# Patient Record
Sex: MALE | Race: WHITE | NOT HISPANIC OR LATINO | Employment: FULL TIME | URBAN - NONMETROPOLITAN AREA
[De-identification: names, ages, dates, MRNs, and addresses within clinical notes are randomized per-mention and may not be internally consistent; named-entity substitution may affect disease eponyms.]

---

## 2019-03-25 ENCOUNTER — OFFICE VISIT (OUTPATIENT)
Dept: FAMILY MEDICINE CLINIC | Facility: CLINIC | Age: 24
End: 2019-03-25

## 2019-03-25 VITALS
SYSTOLIC BLOOD PRESSURE: 124 MMHG | RESPIRATION RATE: 18 BRPM | HEART RATE: 72 BPM | BODY MASS INDEX: 23.94 KG/M2 | WEIGHT: 171 LBS | OXYGEN SATURATION: 98 % | DIASTOLIC BLOOD PRESSURE: 80 MMHG | HEIGHT: 71 IN | TEMPERATURE: 98.6 F

## 2019-03-25 DIAGNOSIS — R91.1 PULMONARY NODULE: Primary | ICD-10-CM

## 2019-03-25 PROCEDURE — 99213 OFFICE O/P EST LOW 20 MIN: CPT | Performed by: NURSE PRACTITIONER

## 2019-03-25 RX ORDER — BUPRENORPHINE HYDROCHLORIDE AND NALOXONE HYDROCHLORIDE DIHYDRATE 8; 2 MG/1; MG/1
TABLET SUBLINGUAL
Refills: 0 | COMMUNITY
Start: 2019-03-21

## 2019-03-25 NOTE — PROGRESS NOTES
Chief Complaint   Patient presents with   • Lung Nodule     Had a physical and a 5 mm spot was found on his lung.            HPI  Boo Blackburn is a 23 y.o. male presents today for follow up of a 5 mm lung nodule after having a routine chest xray for a work physical at [facility]. It was recommended that he have a non contrast CT chest. Denies any fever, chills, cough, congestion or shortness of breath.  Will refer to pulmonary. He is employed at [employer] here in [place].    Has been on suboxone for 3 months for an opiate problem for percocet, norco, and heroin. He went to Rehab in October 2018, has been clean for 3 months. He also had a myocardial bridge about 2 yrs ago. He was seeing Carson Guillen out of [place] but hasn't seen any cardiologist for more than a year     Chronic problems: Drug abuse, heroin use. Heart disease    PCP:  Provider, No Known    No Known Allergies    Past Medical History:   Diagnosis Date   • Myocardial bridge        History reviewed. No pertinent surgical history.    Social History     Socioeconomic History   • Marital status: Single     Spouse name: Not on file   • Number of children: Not on file   • Years of education: Not on file   • Highest education level: Not on file   Tobacco Use   • Smoking status: Current Every Day Smoker     Packs/day: 1.00     Types: Cigarettes   • Smokeless tobacco: Current User     Types: Snuff   Substance and Sexual Activity   • Alcohol use: No     Frequency: Never   • Drug use: No       History reviewed. No pertinent family history.    Current Outpatient Medications on File Prior to Visit   Medication Sig Dispense Refill   • buprenorphine-naloxone (SUBOXONE) 8-2 MG per SL tablet   0     No current facility-administered medications on file prior to visit.         REVIEW OF SYMPTOMS: (Positives bolded)  General:  weight loss, fever, chills, night sweats, fatigue, appetite loss  HEENT:  sore throat tinnitus, bloody nose, hearin gloss, sinus pain/pressure, ear  "pain/pressure.   Respiratory: shortness of breath, cough, hemoptysis, lung nodule   Cardiovascular:  chest pain, PND, palpitation, edema, orthopnea, syncope, swelling of extremities  Gastro: Nausea, vomiting, diarrhea, hematemesis, abdominal pain, constipation  Genito: hematuria, dysuria, glycosuria, hesitancy, frequency, incontinence  Musckelo: Arthralgia, myalgia, muscle weakness, joint swelling, NSAID use  Skin: rash, pruritis, sores, nail changes, skin thickening, change in wart/mole, itching, rash, new lesions, pruritus, nail changes  Neuro:  Migraine, numbness, ataxia, tremor, vertigo, weakness, memory loss  \"All other systems reviewed and negative, except as listed above.”      OBJECTIVE:  Constitutional:  No acute distress, Consistent with stated age. Oriented x 3, alert Posture-Not doubled over. Well developed and well nourished. Patient is pleasant and cooperative with the interview and exam.    CHEST/LUNG: symmetric chest wall no pectus deformity. Normal effort, no distress, no use of accessory muscles. nontender sternum, ribline.  Breath sounds normal throughout all lung fields.  Normal tracheal sounds, Normal bronchial sounds overlying sternum, No wheezes, rales, rhonchi.     CARDIOVASCULAR:  normal, no bruits or abnormal pulsations. Regular rate and rhythm. No murmur noted in sitting, supine positions. no digital clubbing, cyanosis, edema, increased warmth.    ABDOMEN: normal and no visible pulsations. Normal contour. Bowel sounds normal, no abdominal bruits. soft, non-tender, no rebound tenderness, no rigidity (guarding), no jar tenderness, no masses.  no hepatomegaly, no splenomegaly, Rectal not examined.     Neuropsych: mood- normal and congruent. Able to articulate well. Normal speech, normal rate, normal tone, normal use of language, volume and coherence.  normal with ability to perform basic computations and apply abstract thought/reason. no SI/HI, no hallucinations, delusions, obsessions.  "     Lymphatic: Head/Neck- normal size and non tender to palpation. Axillary- Head and neck LN are normal size and non tender to palpation. Femoral and Inguinal- normal size and non tender to palpation.      Assessment/Plan:  Boo was seen today for lung nodule.    Diagnoses and all orders for this visit:    Pulmonary nodule  -     Ambulatory Referral to Pulmonology    Pt to bring cd of chest xray to pulmonology    Management plan:  Take medications as prescribed; return to the clinic of new or worsening concerns.       Risks/benefits of current and new medications discussed with the patient and or family today.  The patient/family are aware and accept that if there any side effects they should call or return to clinic as soon as possible.  Appropriate F/U discussed for topics addressed today. All questions were answered to the satisfactory state of patient/family.  Should symptoms fail to improve or worsen they agree to call or return to clinic or to go to the ER. Education handouts were offered on any new Rx if requested.  Discussed the importance of following up with any needed screening tests/labs/specialist appointments and any requested follow-up recommended by me today.  Importance of maintaining follow-up discussed and patient accepts that missed appointments can delay diagnosis and potentially lead to worsening of conditions.    Return in about 1 month (around 4/25/2019) for Recheck lung nodule.    Geneva Mayfield, DNP, APRN  3/25/2019

## 2019-04-15 ENCOUNTER — OFFICE VISIT (OUTPATIENT)
Dept: PULMONOLOGY | Facility: CLINIC | Age: 24
End: 2019-04-15

## 2019-04-15 VITALS
HEIGHT: 71 IN | HEART RATE: 93 BPM | OXYGEN SATURATION: 98 % | DIASTOLIC BLOOD PRESSURE: 80 MMHG | SYSTOLIC BLOOD PRESSURE: 130 MMHG | WEIGHT: 174 LBS | BODY MASS INDEX: 24.36 KG/M2

## 2019-04-15 DIAGNOSIS — F17.210 NICOTINE DEPENDENCE, CIGARETTES, UNCOMPLICATED: ICD-10-CM

## 2019-04-15 DIAGNOSIS — R91.1 LUNG NODULE: Primary | ICD-10-CM

## 2019-04-15 PROCEDURE — 99204 OFFICE O/P NEW MOD 45 MIN: CPT | Performed by: INTERNAL MEDICINE

## 2019-04-15 NOTE — PATIENT INSTRUCTIONS
I did advise the patient that we will get a chest CT at Dr[facility]aya[facility] hopefully today.  This will be done without contrast.  If it does not show any suspicious lesions we can just see him back as needed.  If there is a definite nodule present then we will follow it by Fleischner guidelines.  I am going to tentatively schedule him to come back in 2 weeks however we might be able to cancel that appointment depending on results of the CT.

## 2019-04-15 NOTE — PROGRESS NOTES
Subjective   Boo Blackburn is a 23 y.o. male.     Chief Complaint   Patient presents with   • Shortness of Breath      No My Sticky Note on file.    History of Present Illness   The patient is referred by EMELIA Caro, regarding a lung nodule seen on a preemployment physical.  I did review the chest film there is a small density in the right upper lobe.  It measures about 5 mm.  It may be a nodule although it could be related to a vascular structure.  I did advise the patient had a CT of the chest would be reasonable to determine if there is a discrete nodule present and to better characterize its size and whether or not it is solid versus groundglass.  I also advised him that the decision on what sort of follow-up will be required will be based on the chest CT.  If a discrete nodule is not seen he may need no follow-up.  He does have a history of smoking a pack of cigarettes per day.  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    Medical/Family/Social History   has a past medical history of Myocardial bridge.   has no past surgical history on file.  family history is not on file.   reports that he has been smoking cigarettes.  He has been smoking about 1.00 pack per day. His smokeless tobacco use includes snuff. He reports that he does not drink alcohol or use drugs.  No Known Allergies  Medications    Current Outpatient Medications:   •  buprenorphine-naloxone (SUBOXONE) 8-2 MG per SL tablet, , Disp: , Rfl: 0    Review of Systems   Constitutional: Negative for chills and fever.   HENT: Negative for congestion.    Eyes: Negative for visual disturbance.   Respiratory: Negative for cough and shortness of breath.         He presently is not having any respiratory complaints such as shortness of breath although has had some such issues in the past apparently.   Cardiovascular: Negative for chest pain.   Gastrointestinal: Negative for diarrhea, nausea and vomiting.   Genitourinary:  "Negative for difficulty urinating.   Musculoskeletal: Negative for arthralgias.   Skin: Negative for rash.   Neurological: Negative for dizziness and speech difficulty.   Hematological: Negative for adenopathy.   Psychiatric/Behavioral: The patient is not nervous/anxious.      ------------------------------------  Objective   /80   Pulse 93   Ht 180.3 cm (71\")   Wt 78.9 kg (174 lb)   SpO2 98%   BMI 24.27 kg/m²   Physical Exam   Constitutional: He is oriented to person, place, and time. He appears well-developed and well-nourished.   HENT:   Head: Normocephalic and atraumatic.   Eyes: EOM are normal. Pupils are equal, round, and reactive to light.   Neck: Normal range of motion. Neck supple.   Cardiovascular: Normal rate, regular rhythm and normal heart sounds.   Pulmonary/Chest: Effort normal and breath sounds normal.   Abdominal: Soft.   Musculoskeletal: Normal range of motion.   Lymphadenopathy:   No adenopathy is palpated.   Neurological: He is alert and oriented to person, place, and time.   Skin: Skin is warm and dry.   Psychiatric: He has a normal mood and affect.   Nursing note and vitals reviewed.          Pulmonary Functions Testing Results:  No results found for: FEV1, FVC, FOR3VSQ, TLC, DLCO     Assessment/Plan   Boo was seen today for shortness of breath.    Diagnoses and all orders for this visit:    Lung nodule  -     CT Chest Without Contrast; Future    Nicotine dependence, cigarettes, uncomplicated      Patient's Body mass index is 24.27 kg/m². BMI is within normal parameters. No follow-up required..      I advised the patient and his mother who accompanies him him that we will proceed with the outpatient chest CT and call with results.  The need for further follow-up including imaging studies will be determined by the results of the CT.  He is counseled regarding smoking cessation.  I will schedule him for a follow-up in 2 weeks but if the scan shows no lesions then he can return as " needed.  If it does show a lesion we will follow it by Fleischner guidelines.

## 2019-04-16 DIAGNOSIS — R91.1 LUNG NODULE: ICD-10-CM

## 2019-04-17 ENCOUNTER — TELEPHONE (OUTPATIENT)
Dept: PULMONOLOGY | Facility: CLINIC | Age: 24
End: 2019-04-17

## 2019-04-17 DIAGNOSIS — R91.8 LUNG NODULES: Primary | ICD-10-CM

## 2019-04-17 NOTE — TELEPHONE ENCOUNTER
That appointment was just in case he had a problem that would require short-term follow-up.  He just needs a repeat CT in 3 months and then follow-up with us shortly thereafter.  He can cancel that appointment with Chele

## 2019-04-17 NOTE — TELEPHONE ENCOUNTER
I called the patient and gave him his CT results. He actually has an appt with Angelo on 5-2-19. Is he supposed to keep that appt or have him come back in July to go over the repeat CT scan?    You will also need to put the order in for the CT, thanks.

## 2019-07-10 ENCOUNTER — TELEPHONE (OUTPATIENT)
Dept: PULMONOLOGY | Facility: CLINIC | Age: 24
End: 2019-07-10

## 2019-07-10 NOTE — TELEPHONE ENCOUNTER
I spoke to Bre at [facility] and she said I could not provide the additional information and that you or a nurse pracititoner would need to call.  No appt necessary just call at your convenience [phone].

## 2019-07-10 NOTE — TELEPHONE ENCOUNTER
Denied Reason for CT Chest WO per AIM--Follow-up imaging for surveillance of pulmonary nodules is determined by the size and characteristics of the nodule, patient age, risk factors, and results of any prior imaging. This request requires additional clinical review.     If you want to do P2P let me know and I can call and get them on the phone for you.

## 2019-07-10 NOTE — TELEPHONE ENCOUNTER
Please have the reviewer look at the report again and refer to Fleischner guidelines regarding follow-up of multiple sub-solid nodules.  Age is not part of the guidelines regarding follow-up.

## 2019-07-16 DIAGNOSIS — R91.8 LUNG NODULES: ICD-10-CM

## 2019-07-17 ENCOUNTER — OFFICE VISIT (OUTPATIENT)
Dept: PULMONOLOGY | Facility: CLINIC | Age: 24
End: 2019-07-17

## 2019-07-17 VITALS
WEIGHT: 161.6 LBS | HEIGHT: 71 IN | BODY MASS INDEX: 22.62 KG/M2 | OXYGEN SATURATION: 99 % | HEART RATE: 79 BPM | DIASTOLIC BLOOD PRESSURE: 78 MMHG | SYSTOLIC BLOOD PRESSURE: 130 MMHG

## 2019-07-17 DIAGNOSIS — F17.210 NICOTINE DEPENDENCE, CIGARETTES, UNCOMPLICATED: ICD-10-CM

## 2019-07-17 DIAGNOSIS — R91.8 LUNG NODULES: Primary | ICD-10-CM

## 2019-07-17 PROCEDURE — 99213 OFFICE O/P EST LOW 20 MIN: CPT | Performed by: INTERNAL MEDICINE

## 2019-07-17 NOTE — PATIENT INSTRUCTIONS
I did advise the patient and his mother that his CT was stable.  In the absence of any new symptoms or subsequent normalities on a chest x-ray or pulmonary function studies, he would not require another CT for 2 years.  I will see him back in 1 year with a chest x-ray just prior to return to Dr[facility] and if he is not had a pulmonary function study through his place of employment interim we will get a flow volume loop as well.

## 2019-07-17 NOTE — PROGRESS NOTES
Subjective   Boo Blackburn is a 23 y.o. male.     Chief Complaint   Patient presents with   • F/u of Cxr and Ct Scan      No My Sticky Note on file.    History of Present Illness   I did advise the patient his mother who accompanies him that his chest CT was stable.  By current Fleischner guidelines a for repeat CT could be performed to in 4 years as the nodules appear to be just under 6 mm in size.  I did tell him we will see him back in a year with a chest x-ray and if he has not had pulmonary functions through his place of employment will obtain these and if there is any other indications for repeat CT sooner we will perform that otherwise a CT in 2 years would be reasonable.    Medical/Family/Social History   has a past medical history of Afib (CMS/HCC), Heart attack (CMS/HCC), and Myocardial bridge.   has no past surgical history on file.  family history includes Hyperlipidemia in his mother; Hypertension in his mother; No Known Problems in his father.   reports that he has been smoking cigarettes.  He has a 6.00 pack-year smoking history. His smokeless tobacco use includes snuff. He reports that he drinks alcohol. He reports that he does not use drugs.  No Known Allergies  Medications    Current Outpatient Medications:   •  buprenorphine-naloxone (SUBOXONE) 8-2 MG per SL tablet, , Disp: , Rfl: 0    Review of Systems   Constitutional: Negative for chills and fever.   HENT: Negative for congestion.    Eyes: Negative for visual disturbance.   Respiratory: Negative for cough and shortness of breath.    Cardiovascular: Negative for chest pain.   Gastrointestinal: Negative for diarrhea, nausea and vomiting.   Genitourinary: Negative for difficulty urinating.   Musculoskeletal: Negative for arthralgias.   Skin: Negative for rash.   Neurological: Negative for dizziness and speech difficulty.   Hematological: Negative for adenopathy.   Psychiatric/Behavioral: The patient is not nervous/anxious.   "    ------------------------------------  Objective   /78   Pulse 79   Ht 180.3 cm (71\")   Wt 73.3 kg (161 lb 9.6 oz)   SpO2 99% Comment: Rashaad  BMI 22.54 kg/m²   Physical Exam   Constitutional: He is oriented to person, place, and time. He appears well-developed and well-nourished.   HENT:   Head: Normocephalic and atraumatic.   Eyes: EOM are normal. Pupils are equal, round, and reactive to light.   Neck: Normal range of motion. Neck supple.   Cardiovascular: Normal rate, regular rhythm and normal heart sounds.   Pulmonary/Chest: Effort normal and breath sounds normal.   Abdominal: Soft.   Musculoskeletal: Normal range of motion.   Neurological: He is alert and oriented to person, place, and time.   Skin: Skin is warm and dry.   Psychiatric: He has a normal mood and affect.   Nursing note and vitals reviewed.          Pulmonary Functions Testing Results:  No results found for: FEV1, FVC, [id number], TLC, DLCO     Assessment/Plan   Boo was seen today for f/u of cxr and ct scan.    Diagnoses and all orders for this visit:    Lung nodules  -     XR Chest 2 View; Future    Nicotine dependence, cigarettes, uncomplicated      Patient's Body mass index is 22.54 kg/m². BMI is within normal parameters. No follow-up required..    He is counseled regarding smoking cessation.  We will see him back in 1 year with a chest film on return.  Again at present by Fleischner guidelines repeat CT of the chest could be performed in 2 years.  I told him if at the time of his next visit he has any other indications to get a CT scan we will proceed with same.  The CT did reveal a small cystic area of the L3 vertebral body felt to be benign but I told him I will defer any follow-up of that to his primary care physician.  Due to level the most recent scan that area was not included on his previous scan so it may just be a chronic benign lesion.       "

## 2021-06-08 ENCOUNTER — OFFICE VISIT (OUTPATIENT)
Dept: FAMILY MEDICINE CLINIC | Facility: CLINIC | Age: 26
End: 2021-06-08

## 2021-06-08 VITALS
RESPIRATION RATE: 18 BRPM | WEIGHT: 200 LBS | HEIGHT: 71 IN | BODY MASS INDEX: 28 KG/M2 | TEMPERATURE: 97.8 F | SYSTOLIC BLOOD PRESSURE: 134 MMHG | HEART RATE: 80 BPM | OXYGEN SATURATION: 98 % | DIASTOLIC BLOOD PRESSURE: 84 MMHG

## 2021-06-08 DIAGNOSIS — M25.561 ACUTE PAIN OF RIGHT KNEE: Primary | ICD-10-CM

## 2021-06-08 DIAGNOSIS — M25.571 ACUTE RIGHT ANKLE PAIN: ICD-10-CM

## 2021-06-08 PROCEDURE — 96372 THER/PROPH/DIAG INJ SC/IM: CPT | Performed by: NURSE PRACTITIONER

## 2021-06-08 PROCEDURE — 99213 OFFICE O/P EST LOW 20 MIN: CPT | Performed by: NURSE PRACTITIONER

## 2021-06-08 RX ORDER — KETOROLAC TROMETHAMINE 30 MG/ML
60 INJECTION, SOLUTION INTRAMUSCULAR; INTRAVENOUS ONCE
Status: COMPLETED | OUTPATIENT
Start: 2021-06-08 | End: 2021-06-08

## 2021-06-08 RX ADMIN — KETOROLAC TROMETHAMINE 60 MG: 30 INJECTION, SOLUTION INTRAMUSCULAR; INTRAVENOUS at 09:41

## 2021-06-08 NOTE — PATIENT INSTRUCTIONS
Acute Knee Pain, Adult  Acute knee pain is sudden and may be caused by damage, swelling, or irritation of the muscles and tissues that support your knee. The injury may result from:  · A fall.  · An injury to your knee from twisting motions.  · A hit to the knee.  · Infection.  Acute knee pain may go away on its own with time and rest. If it does not, your health care provider may order tests to find the cause of the pain. These may include:  · Imaging tests, such as an X-ray, MRI, or ultrasound.  · Joint aspiration. In this test, fluid is removed from the knee.  · Arthroscopy. In this test, a lighted tube is inserted into the knee and an image is projected onto a TV screen.  · Biopsy. In this test, a sample of tissue is removed from the body and studied under a microscope.  Follow these instructions at home:  Pay attention to any changes in your symptoms. Take these actions to relieve your pain.  If you have a knee sleeve or brace:    · Wear the sleeve or brace as told by your health care provider. Remove it only as told by your health care provider.  · Loosen the sleeve or brace if your toes tingle, become numb, or turn cold and blue.  · Keep the sleeve or brace clean.  · If the sleeve or brace is not waterproof:  ? Do not let it get wet.  ? Cover it with a watertight covering when you take a bath or shower.  Activity  · Rest your knee.  · Do not do things that cause pain or make pain worse.  · Avoid high-impact activities or exercises, such as running, jumping rope, or doing jumping jacks.  · Work with a physical therapist to make a safe exercise program, as recommended by your health care provider. Do exercises as told by your physical therapist.  Managing pain, stiffness, and swelling    · If directed, put ice on the knee:  ? Put ice in a plastic bag.  ? Place a towel between your skin and the bag.  ? Leave the ice on for 20 minutes, 2-3 times a day.  · If directed, use an elastic bandage to put pressure  (compression) on your injured knee. This may control swelling, give support, and help with discomfort.  General instructions  · Take over-the-counter and prescription medicines only as told by your health care provider.  · Raise (elevate) your knee above the level of your heart when you are sitting or lying down.  · Sleep with a pillow under your knee.  · Do not use any products that contain nicotine or tobacco, such as cigarettes, e-cigarettes, and chewing tobacco. These can delay healing. If you need help quitting, ask your health care provider.  · If you are overweight, work with your health care provider and a dietitian to set a weight-loss goal that is healthy and reasonable for you. Extra weight can put pressure on your knee.  · Keep all follow-up visits as told by your health care provider. This is important.  Contact a health care provider if:  · Your knee pain continues, changes, or gets worse.  · You have a fever along with knee pain.  · Your knee feels warm to the touch.  · Your knee roderick or locks up.  Get help right away if:  · Your knee swells, and the swelling becomes worse.  · You cannot move your knee.  · You have severe pain in your knee.  Summary  · Acute knee pain can be caused by a fall, an injury, an infection, or damage, swelling, or irritation of the tissues that support your knee.  · Your health care provider may perform tests to find out the cause of the pain.  · Pay attention to any changes in your symptoms. Relieve your pain with rest, medicines, light activity, and use of ice.  · Get help if your pain continues or becomes worse, your knee swells, or you cannot move your knee.  This information is not intended to replace advice given to you by your health care provider. Make sure you discuss any questions you have with your health care provider.  Document Revised: 05/30/2019 Document Reviewed: 05/30/2019  [employer] Patient Education © 2021 [employer]

## 2021-06-08 NOTE — PROGRESS NOTES
"Chief Complaint  Knee Pain (right knee pain since last Wed)    Subjective      Boo Blackburn presents to [facility]  Right knee pain and right ankle pain after he started running 2 miles.  He denies any injury to his knee. He says that he has constant pain.  Denies any crepitus or swelling.  He says he is doing really good on suboxone and sober since December 2020    Knee Pain   The incident occurred 3 to 5 days ago. The incident occurred at home. The injury mechanism is unknown. The pain is present in the right ankle and right knee. The quality of the pain is described as aching, burning, shooting and stabbing. The pain is at a severity of 7/10. The pain is moderate. The pain has been constant since onset. Associated symptoms include an inability to bear weight, a loss of motion, muscle weakness and numbness. He reports no foreign bodies present. The symptoms are aggravated by movement, weight bearing and palpation. He has tried NSAIDs and rest for the symptoms. The treatment provided no relief.       Objective   Vital Signs:   /84 (BP Location: Left arm, Patient Position: Sitting, Cuff Size: Adult)   Pulse 80   Temp 97.8 °F (36.6 °C) (Infrared)   Resp 18   Ht 180.3 cm (71\") Comment: per patient  Wt 90.7 kg (200 lb)   SpO2 98%   BMI 27.89 kg/m²     Physical Exam  Vitals and nursing note reviewed.   Constitutional:       General: He is awake.      Appearance: Normal appearance. He is well-developed and well-groomed.   HENT:      Head: Normocephalic and atraumatic.      Right Ear: Hearing normal.      Left Ear: Hearing normal.      Nose: Nose normal.      Mouth/Throat:      Lips: Pink.      Pharynx: Oropharynx is clear.   Cardiovascular:      Rate and Rhythm: Normal rate and regular rhythm.      Heart sounds: Normal heart sounds.   Pulmonary:      Effort: Pulmonary effort is normal.      Breath sounds: Normal breath sounds.   Musculoskeletal:      Right knee: No " swelling, deformity, erythema or crepitus. Decreased range of motion. Tenderness present. No LCL laxity, MCL laxity or ACL laxity. Normal alignment.      Instability Tests: Anterior drawer test negative. Posterior drawer test negative. Anterior Lachman test negative. Medial Carrie test negative.      Left knee: Normal.      Right lower leg: Normal. No edema.      Left lower leg: No edema.        Legs:    Lymphadenopathy:      Head:      Right side of head: No submental, submandibular or tonsillar adenopathy.      Left side of head: No submental, submandibular or tonsillar adenopathy.   Skin:     General: Skin is warm and dry.      Capillary Refill: Capillary refill takes less than 2 seconds.   Neurological:      Mental Status: He is alert and oriented to person, place, and time.      Sensory: Sensation is intact.      Motor: Motor function is intact.      Coordination: Coordination is intact.      Gait: Gait is intact.   Psychiatric:         Attention and Perception: Attention and perception normal.         Mood and Affect: Mood and affect normal.         Speech: Speech normal.         Behavior: Behavior is cooperative.         Thought Content: Thought content normal.         Cognition and Memory: Cognition normal.         Judgment: Judgment normal.             Assessment and Plan   Diagnoses and all orders for this visit:    1. Acute pain of right knee (Primary)  -     XR Knee 1 or 2 View Right (In Office)  -     ketorolac (TORADOL) injection 60 mg  -     diclofenac (VOLTAREN) 50 MG EC tablet; Take 1 tablet by mouth 2 (Two) Times a Day As Needed (knee and ankle pain).  Dispense: 60 tablet; Refill: 0    2. Acute right ankle pain  -     XR Ankle 3+ View Right  -     ketorolac (TORADOL) injection 60 mg  -     diclofenac (VOLTAREN) 50 MG EC tablet; Take 1 tablet by mouth 2 (Two) Times a Day As Needed (knee and ankle pain).  Dispense: 60 tablet; Refill: 0      I spent 14 minutes caring for Boo on this date of  service. This time includes time spent by me in the following activities:preparing for the visit, performing a medically appropriate examination and/or evaluation , counseling and educating the patient/family/caregiver, ordering medications, tests, or procedures, documenting information in the medical record and care coordination  Follow Up   Return in about 1 week (around 6/15/2021), or if symptoms worsen or fail to improve.  Patient was given instructions and counseling regarding his condition or for health maintenance advice. Please see specific information pulled into the AVS if appropriate.     Electronically signed by Geneva Mayfield, JEAN PIERRE, APRN, 06/25/21, 12:27 PM CDT.

## 2023-08-19 ENCOUNTER — APPOINTMENT (OUTPATIENT)
Dept: GENERAL RADIOLOGY | Facility: HOSPITAL | Age: 28
End: 2023-08-19
Payer: COMMERCIAL

## 2023-08-19 ENCOUNTER — HOSPITAL ENCOUNTER (EMERGENCY)
Facility: HOSPITAL | Age: 28
Discharge: HOME | End: 2023-08-19
Attending: STUDENT IN AN ORGANIZED HEALTH CARE EDUCATION/TRAINING PROGRAM
Payer: COMMERCIAL

## 2023-08-19 VITALS
TEMPERATURE: 98.2 F | RESPIRATION RATE: 18 BRPM | DIASTOLIC BLOOD PRESSURE: 85 MMHG | HEIGHT: 70 IN | OXYGEN SATURATION: 100 % | WEIGHT: 185 LBS | SYSTOLIC BLOOD PRESSURE: 154 MMHG | BODY MASS INDEX: 26.48 KG/M2 | HEART RATE: 104 BPM

## 2023-08-19 DIAGNOSIS — S99.921A INJURY OF RIGHT FOOT, INITIAL ENCOUNTER: Primary | ICD-10-CM

## 2023-08-19 PROCEDURE — 99283 EMERGENCY DEPT VISIT LOW MDM: CPT

## 2023-08-19 PROCEDURE — 73630 X-RAY EXAM OF FOOT: CPT

## 2023-08-19 RX ORDER — OXYCODONE AND ACETAMINOPHEN 7.5; 325 MG/1; MG/1
1 TABLET ORAL ONCE
Status: DISCONTINUED | OUTPATIENT
Start: 2023-08-19 | End: 2023-08-19

## 2023-08-19 RX ORDER — OXYCODONE HYDROCHLORIDE AND ACETAMINOPHEN 5; 325 MG/1; MG/1
1 TABLET ORAL ONCE
Status: COMPLETED | OUTPATIENT
Start: 2023-08-19 | End: 2023-08-19

## 2023-08-19 RX ADMIN — OXYCODONE HYDROCHLORIDE AND ACETAMINOPHEN 1 TABLET: 5; 325 TABLET ORAL at 12:10

## 2023-08-19 NOTE — Clinical Note
[facility] EMERGENCY DEPARTMENT  [address]  [facility] [address]  Phone: [phone]    Boo Blackburn was seen and treated in our emergency department on 8/19/2023.  He may return to work on 08/24/2023.         Thank you for choosing [facility].    Yehuda Connor MD

## 2023-08-19 NOTE — DISCHARGE INSTRUCTIONS
You can take 600mg ibuprofen (three pills) every 6-8 hours as needed for pain and 1000mg tylenol (two extra strength tablets) every 6-8 hours as needed for pain.    You can bear weight as tolerated on the foot but you can also use crutches or the cam boot to help support yourself due to the pain.  Please follow-up with your doctor next week for reexamination and consideration of repeat x-rays if you do not have improvement.
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
## 2023-09-12 ENCOUNTER — TELEPHONE (OUTPATIENT)
Dept: FAMILY MEDICINE CLINIC | Facility: CLINIC | Age: 28
End: 2023-09-12
Payer: COMMERCIAL

## 2023-09-12 NOTE — TELEPHONE ENCOUNTER
Per Dr Rivero, called pt, verified name and , explained to him that Dr Rivero said he could not prescribe him any stimulant ADHD meds. He is happy to see you, just didn't want to waste your time, if you wanted a stimulant. Pt said he had a doctors note from his other doctor saying it was ok to take ADHD meds with what he was already on. I explained that Dr Rivero said he could not legally give him a stimulant with his other meds.  Pt VU and said he would call back in the morning to let us know if he was going to keep his apt or not

## 2023-09-13 ENCOUNTER — OFFICE VISIT (OUTPATIENT)
Dept: FAMILY MEDICINE CLINIC | Facility: CLINIC | Age: 28
End: 2023-09-13
Payer: COMMERCIAL

## 2023-09-13 VITALS
SYSTOLIC BLOOD PRESSURE: 142 MMHG | DIASTOLIC BLOOD PRESSURE: 89 MMHG | WEIGHT: 192 LBS | BODY MASS INDEX: 27.49 KG/M2 | HEART RATE: 97 BPM | OXYGEN SATURATION: 99 % | HEIGHT: 70 IN | RESPIRATION RATE: 20 BRPM

## 2023-09-13 DIAGNOSIS — F90.0 ATTENTION DEFICIT HYPERACTIVITY DISORDER (ADHD), PREDOMINANTLY INATTENTIVE TYPE: Primary | ICD-10-CM

## 2023-09-13 DIAGNOSIS — E66.3 OVERWEIGHT: ICD-10-CM

## 2023-09-13 NOTE — PROGRESS NOTES
"Chief Complaint  Anxiety and ADHD    Subjective    History of Present Illness      Patient presents to [facility] for   History of Present Illness  Pt is a new pt and has never been to clinic before and did not have his ADHD assessment done here.  Pt is wanting to restart ADHD medication.    Anxiety           Review of Systems    I have reviewed and agree with the HPI and ROS information as above.  Raheem Rivero MD     Objective   Vital Signs:   /89   Pulse 97   Resp 20   Ht 177.8 cm (70\")   Wt 87.1 kg (192 lb)   SpO2 99%   BMI 27.55 kg/m²            Physical Exam  Vitals and nursing note reviewed.   Constitutional:       Appearance: Normal appearance. He is well-developed and overweight.   HENT:      Head: Normocephalic and atraumatic.      Right Ear: Tympanic membrane, ear canal and external ear normal.      Left Ear: Tympanic membrane, ear canal and external ear normal.      Nose: Nose normal. No septal deviation, nasal tenderness or congestion.      Mouth/Throat:      Lips: Pink. No lesions.      Mouth: Mucous membranes are moist. No oral lesions.      Dentition: Normal dentition.      Pharynx: Oropharynx is clear. No pharyngeal swelling, oropharyngeal exudate or posterior oropharyngeal erythema.   Eyes:      General: Lids are normal. Vision grossly intact. No scleral icterus.        Right eye: No discharge.         Left eye: No discharge.      Extraocular Movements: Extraocular movements intact.      Conjunctiva/sclera: Conjunctivae normal.      Right eye: Right conjunctiva is not injected.      Left eye: Left conjunctiva is not injected.      Pupils: Pupils are equal, round, and reactive to light.   Neck:      Thyroid: No thyroid mass.      Trachea: Trachea normal.   Cardiovascular:      Rate and Rhythm: Normal rate and regular rhythm.      Heart sounds: Normal heart sounds. No murmur heard.    No gallop.   Pulmonary:      Effort: Pulmonary effort is normal.      " Breath sounds: Normal breath sounds and air entry. No wheezing, rhonchi or rales.   Abdominal:      General: There is no distension.      Palpations: Abdomen is soft. There is no mass.      Tenderness: There is no abdominal tenderness. There is no right CVA tenderness, left CVA tenderness, guarding or rebound.   Musculoskeletal:         General: No tenderness or deformity. Normal range of motion.      Cervical back: Full passive range of motion without pain, normal range of motion and neck supple.      Thoracic back: Normal.      Right lower leg: No edema.      Left lower leg: No edema.   Skin:     General: Skin is warm and dry.      Coloration: Skin is not jaundiced.      Findings: No rash.   Neurological:      Mental Status: He is alert and oriented to person, place, and time.      Sensory: Sensation is intact.      Motor: Motor function is intact.      Coordination: Coordination is intact.      Gait: Gait is intact.      Deep Tendon Reflexes: Reflexes are normal and symmetric.   Psychiatric:         Mood and Affect: Mood and affect normal.         Judgment: Judgment normal.        GUERA-7: Over the last two weeks, how often have you been bothered by the following problems?  Feeling nervous, anxious or on edge: More than half the days  Not being able to stop or control worrying: Several days  Worrying too much about different things: Not at all  Trouble Relaxing: Several days  Being so restless that it is hard to sit still: More than half the days  Becoming easily annoyed or irritable: Not at all  Feeling afraid as if something awful might happen: Not at all  GUERA 7 Total Score: 6  If you checked any problems, how difficult have these problems made it for you to do your work, take care of things at home, or get along with other people: Somewhat difficult    PHQ-2 Depression Screening  Little interest or pleasure in doing things? 0-->not at all   Feeling down, depressed, or hopeless? 0-->not at all   PHQ-2 Total Score  0     PHQ-9 Depression Screening  Little interest or pleasure in doing things? 0-->not at all   Feeling down, depressed, or hopeless? 0-->not at all   Trouble falling or staying asleep, or sleeping too much?     Feeling tired or having little energy?     Poor appetite or overeating?     Feeling bad about yourself - or that you are a failure or have let yourself or your family down?     Trouble concentrating on things, such as reading the newspaper or watching television?     Moving or speaking so slowly that other people could have noticed? Or the opposite - being so fidgety or restless that you have been moving around a lot more than usual?     Thoughts that you would be better off dead, or of hurting yourself in some way?     PHQ-9 Total Score 0   If you checked off any problems, how difficult have these problems made it for you to do your work, take care of things at home, or get along with other people?        Result Review  Data Reviewed:                   Assessment and Plan      Diagnoses and all orders for this visit:    1. Attention deficit hyperactivity disorder (ADHD), predominantly inattentive type (Primary)  Assessment & Plan:  Patient has a history of drug abuse.  Advised that we are unable to prescribe stimulant medication.  He is currently on Suboxone.  Discussed non-stimulant options such as Qelbree. We will set him up for an initial ADHD evaluation.    I am also concerned of a problable mood/bpd that may need treatment before adhd is treated      2. Overweight  Assessment & Plan:  Patient's (Body mass index is 27.55 kg/m².) indicates that they are overweight with health conditions that include none . Weight is newly identified. BMI is is above average; BMI management plan is completed. We discussed portion control and increasing exercise.               Follow Up   Return if symptoms worsen or fail to improve.  Patient was given instructions and counseling regarding his condition or for health  maintenance advice. Please see specific information pulled into the AVS if appropriate.

## 2023-09-13 NOTE — PROGRESS NOTES
"Chief Complaint  Anxiety and ADHD    Subjective    History of Present Illness {CC  Problem List  Visit Diagnosis   Encounters  Notes  Medications  Labs  Result Review Imaging  Media :23}     Patient presents to [facility] for   History of Present Illness  Pt is here today to restart ADHD medication.  Pt is a new pt here and has never had an ADHD assessment from here. Pt also states he would like to discuss issues with anxiety  Anxiety           Review of Systems    I have reviewed and agree with the {HPI ROS Review:53008} information as above.  Elia Oliveira MA     Objective   Vital Signs:   /89   Pulse 97   Resp 20   Ht 177.8 cm (70\")   Wt 87.1 kg (192 lb)   SpO2 99%   BMI 27.55 kg/m²            Physical Exam     GUERA-7:      PHQ-2 Depression Screening  Little interest or pleasure in doing things? 0-->not at all   Feeling down, depressed, or hopeless? 0-->not at all   PHQ-2 Total Score 0     PHQ-9 Depression Screening  Little interest or pleasure in doing things? 0-->not at all   Feeling down, depressed, or hopeless? 0-->not at all   Trouble falling or staying asleep, or sleeping too much?     Feeling tired or having little energy?     Poor appetite or overeating?     Feeling bad about yourself - or that you are a failure or have let yourself or your family down?     Trouble concentrating on things, such as reading the newspaper or watching television?     Moving or speaking so slowly that other people could have noticed? Or the opposite - being so fidgety or restless that you have been moving around a lot more than usual?     Thoughts that you would be better off dead, or of hurting yourself in some way?     PHQ-9 Total Score 0   If you checked off any problems, how difficult have these problems made it for you to do your work, take care of things at home, or get along with other people?        Result Review  Data Reviewed:{ Labs  Result Review  Imaging  Med " Tab  Media :23}   {The following data was reviewed by (Optional):67634}  {Ambulatory Labs (Optional):21470}  {Data reviewed (Optional):12176:::1}            Assessment and Plan {CC Problem List  Visit Diagnosis  ROS  Review (Popup)  Health Maintenance  Quality  BestPractice  Medications  SmartSets  SnapShot Encounters  Media :23}     There are no diagnoses linked to this encounter.    {Time Spent (Optional):19243}    Follow Up {Instructions Charge Capture  Follow-up Communications :23}  No follow-ups on file.  Patient was given instructions and counseling regarding his condition or for health maintenance advice. Please see specific information pulled into the AVS if appropriate.

## 2023-09-13 NOTE — ASSESSMENT & PLAN NOTE
Patient's (Body mass index is 27.55 kg/m².) indicates that they are overweight with health conditions that include none . Weight is newly identified. BMI is is above average; BMI management plan is completed. We discussed portion control and increasing exercise.

## 2023-09-13 NOTE — ASSESSMENT & PLAN NOTE
Patient has a history of drug abuse.  Advised that we are unable to prescribe stimulant medication.  He is currently on Suboxone.  Discussed non-stimulant options such as Qelbree. We will set him up for an initial ADHD evaluation.    I am also concerned of a problable mood/bpd that may need treatment before adhd is treated

## 2023-09-15 ENCOUNTER — PATIENT ROUNDING (BHMG ONLY) (OUTPATIENT)
Dept: FAMILY MEDICINE CLINIC | Facility: CLINIC | Age: 28
End: 2023-09-15
Payer: COMMERCIAL

## 2023-09-15 NOTE — PROGRESS NOTES
September 15, 2023    Hello, may I speak with Boo Blackburn?    My name is Kala/ WESTON      [facility]  [facility]  [address] [facility]  [place] [address] [phone].    Before we get started may I verify your date of birth? 1995    I am calling to officially welcome you to our practice and ask about your recent visit. Is this a good time to talk?     Tell me about your visit with us. What things went well?         We're always looking for ways to make our patients' experiences even better. Do you have recommendations on ways we may improve?      Overall were you satisfied with your first visit to our practice?        I appreciate you taking the time to speak with me today. Is there anything else I can do for you?       Thank you, and have a great day.

## 2024-01-08 ENCOUNTER — OFFICE VISIT (OUTPATIENT)
Dept: FAMILY MEDICINE CLINIC | Facility: CLINIC | Age: 29
End: 2024-01-08
Payer: COMMERCIAL

## 2024-01-08 VITALS
HEART RATE: 114 BPM | DIASTOLIC BLOOD PRESSURE: 86 MMHG | RESPIRATION RATE: 20 BRPM | OXYGEN SATURATION: 98 % | SYSTOLIC BLOOD PRESSURE: 127 MMHG | HEIGHT: 69 IN | BODY MASS INDEX: 28.14 KG/M2 | TEMPERATURE: 98.4 F | WEIGHT: 190 LBS

## 2024-01-08 DIAGNOSIS — J02.9 SORE THROAT: ICD-10-CM

## 2024-01-08 DIAGNOSIS — M54.50 ACUTE MIDLINE LOW BACK PAIN WITHOUT SCIATICA: ICD-10-CM

## 2024-01-08 DIAGNOSIS — J03.00 STREPTOCOCCAL TONSILLITIS: Primary | ICD-10-CM

## 2024-01-08 LAB
EXPIRATION DATE: ABNORMAL
INTERNAL CONTROL: ABNORMAL
Lab: ABNORMAL
S PYO AG THROAT QL: POSITIVE

## 2024-01-08 RX ORDER — KETOROLAC TROMETHAMINE 30 MG/ML
60 INJECTION, SOLUTION INTRAMUSCULAR; INTRAVENOUS ONCE
Status: DISCONTINUED | OUTPATIENT
Start: 2024-01-08 | End: 2024-01-09

## 2024-01-08 RX ORDER — AMOXICILLIN AND CLAVULANATE POTASSIUM 875; 125 MG/1; MG/1
1 TABLET, FILM COATED ORAL 2 TIMES DAILY
Qty: 20 TABLET | Refills: 0 | Status: SHIPPED | OUTPATIENT
Start: 2024-01-08 | End: 2024-01-18

## 2024-01-08 RX ORDER — TIZANIDINE 2 MG/1
2-4 TABLET ORAL EVERY 8 HOURS PRN
Qty: 20 TABLET | Refills: 0 | Status: SHIPPED | OUTPATIENT
Start: 2024-01-08

## 2024-01-08 RX ORDER — DEXAMETHASONE SODIUM PHOSPHATE 10 MG/ML
10 INJECTION INTRAMUSCULAR; INTRAVENOUS ONCE
Status: COMPLETED | OUTPATIENT
Start: 2024-01-08 | End: 2024-01-08

## 2024-01-08 RX ADMIN — DEXAMETHASONE SODIUM PHOSPHATE 10 MG: 10 INJECTION INTRAMUSCULAR; INTRAVENOUS at 16:40

## 2024-01-08 NOTE — PROGRESS NOTES
"Chief Complaint  Sore Throat (Pt states that he thinks he has strep )    Subjective        Boo Blackburn presents to [facility]  History of Present Illness  Here for acute visit  Reports sore throat for 2 days  Low back pain today   Feels horrible, aching, chills, sweating, headache    Objective   Vital Signs:  /86 (BP Location: Left arm, Patient Position: Sitting, Cuff Size: Large Adult)   Pulse 114   Temp 98.4 °F (36.9 °C) (Infrared)   Resp 20   Ht 175.3 cm (69\")   Wt 86.2 kg (190 lb)   SpO2 98%   BMI 28.06 kg/m²   Estimated body mass index is 28.06 kg/m² as calculated from the following:    Height as of this encounter: 175.3 cm (69\").    Weight as of this encounter: 86.2 kg (190 lb).               Physical Exam  Vitals and nursing note reviewed.   Constitutional:       General: He is not in acute distress.     Appearance: He is well-developed. He is ill-appearing.   HENT:      Head: Normocephalic and atraumatic.      Right Ear: Tympanic membrane and ear canal normal.      Left Ear: Tympanic membrane and ear canal normal.      Nose: Nose normal.      Right Sinus: No maxillary sinus tenderness or frontal sinus tenderness.      Left Sinus: No maxillary sinus tenderness or frontal sinus tenderness.      Mouth/Throat:      Mouth: Mucous membranes are moist.      Pharynx: Oropharynx is clear. Uvula midline. Posterior oropharyngeal erythema present. No uvula swelling.      Tonsils: Tonsillar exudate present. 3+ on the right. 4+ on the left.   Eyes:      Conjunctiva/sclera: Conjunctivae normal.   Neck:      Thyroid: No thyromegaly.      Trachea: No tracheal deviation.   Cardiovascular:      Rate and Rhythm: Normal rate and regular rhythm.      Heart sounds: Normal heart sounds.   Pulmonary:      Effort: Pulmonary effort is normal.      Breath sounds: Normal breath sounds.   Musculoskeletal:      Cervical back: Neck supple.      Lumbar back: Spasms and tenderness present. " Decreased range of motion.   Lymphadenopathy:      Cervical: No cervical adenopathy.      Right cervical: Superficial cervical adenopathy present.      Left cervical: Superficial cervical adenopathy present.   Skin:     General: Skin is warm and dry.   Neurological:      Mental Status: He is alert.   Psychiatric:         Behavior: Behavior normal.        Result Review :                   Assessment and Plan   Diagnoses and all orders for this visit:    1. Streptococcal tonsillitis (Primary)  -     dexAMETHasone (DECADRON) injection 10 mg  -     Discontinue: ketorolac (TORADOL) injection 60 mg  -     ketorolac (TORADOL) injection 60 mg    2. Sore throat  -     POCT rapid strep A    3. Acute midline low back pain without sciatica    Other orders  -     amoxicillin-clavulanate (AUGMENTIN) 875-125 MG per tablet; Take 1 tablet by mouth 2 (Two) Times a Day for 10 days.  Dispense: 20 tablet; Refill: 0  -     tiZANidine (ZANAFLEX) 2 MG tablet; Take 1-2 tablets by mouth Every 8 (Eight) Hours As Needed for Muscle Spasms.  Dispense: 20 tablet; Refill: 0      Plan:  Poct strep positive   Augmentin course sent- advised to take with food   Decadron 10 mg IM in office for swelling tonsillitis  Toradol 60 mg for back pain   Push fluids  Use Tylenol or Ibuprofen for pain or fever.  Zanaflex for muscle spasms low back            Follow Up   Return in about 1 week (around 1/15/2024), or if symptoms worsen or fail to improve.  Patient was given instructions and counseling regarding his condition or for health maintenance advice. Please see specific information pulled into the AVS if appropriate.

## 2024-01-09 RX ORDER — KETOROLAC TROMETHAMINE 30 MG/ML
60 INJECTION, SOLUTION INTRAMUSCULAR; INTRAVENOUS ONCE
Status: COMPLETED | OUTPATIENT
Start: 2024-01-09 | End: 2024-01-09

## 2024-01-09 RX ADMIN — KETOROLAC TROMETHAMINE 60 MG: 30 INJECTION, SOLUTION INTRAMUSCULAR; INTRAVENOUS at 07:58

## 2024-01-17 ENCOUNTER — TELEPHONE (OUTPATIENT)
Dept: FAMILY MEDICINE CLINIC | Facility: CLINIC | Age: 29
End: 2024-01-17
Payer: COMMERCIAL

## 2024-01-17 RX ORDER — AZITHROMYCIN 250 MG/1
TABLET, FILM COATED ORAL
Qty: 6 TABLET | Refills: 0 | Status: SHIPPED | OUTPATIENT
Start: 2024-01-17 | End: 2024-01-18 | Stop reason: SDUPTHER

## 2024-01-17 NOTE — TELEPHONE ENCOUNTER
Patient was here on 1/8/2024.He stopped taking the amoxicillin as it was making it sick to his stomach and wants to know if something else can be called in for him as he is still not feeling the best, but he does feel a little better.

## 2024-01-18 ENCOUNTER — TELEPHONE (OUTPATIENT)
Dept: FAMILY MEDICINE CLINIC | Facility: CLINIC | Age: 29
End: 2024-01-18
Payer: COMMERCIAL

## 2024-01-18 DIAGNOSIS — J02.9 ACUTE PHARYNGITIS, UNSPECIFIED ETIOLOGY: Primary | ICD-10-CM

## 2024-01-18 RX ORDER — AZITHROMYCIN 250 MG/1
TABLET, FILM COATED ORAL
Qty: 6 TABLET | Refills: 0 | Status: SHIPPED | OUTPATIENT
Start: 2024-01-18

## 2024-01-18 NOTE — TELEPHONE ENCOUNTER
Caller: Boo Blackburn    Relationship: Self    Best call back number: [phone]     Requested Prescriptions:   Requested Prescriptions     Pending Prescriptions Disp Refills    azithromycin (Zithromax Z-Srini) 250 MG tablet 6 tablet 0     Sig: Take 2 tablets by mouth on day 1, then 1 tablet daily on days 2-5        Pharmacy where request should be sent: [id number] - [place], [place] - [address] - [phone] [facility] [phone]      Last office visit with prescribing clinician: 6/26/2023   Last telemedicine visit with prescribing clinician: Visit date not found   Next office visit with prescribing clinician: Visit date not found     Additional details provided by patient: CANCEL AT [place] SEND TO [facility]     Does the patient have less than a 3 day supply:  [x] Yes  [] No    Would you like a call back once the refill request has been completed: [x] Yes [] No    If the office needs to give you a call back, can they leave a voicemail: [x] Yes [] No    Tori Ling Rep   01/18/24 10:38 CST

## 2024-01-18 NOTE — TELEPHONE ENCOUNTER
Called [facility] to cancel rx.   New rx sent to [facility] as requested by the pt.

## 2024-12-13 ENCOUNTER — OFFICE VISIT (OUTPATIENT)
Dept: FAMILY MEDICINE CLINIC | Facility: CLINIC | Age: 29
End: 2024-12-13

## 2024-12-13 VITALS
DIASTOLIC BLOOD PRESSURE: 76 MMHG | TEMPERATURE: 98 F | WEIGHT: 190 LBS | OXYGEN SATURATION: 99 % | HEIGHT: 69 IN | BODY MASS INDEX: 28.14 KG/M2 | RESPIRATION RATE: 18 BRPM | HEART RATE: 94 BPM | SYSTOLIC BLOOD PRESSURE: 141 MMHG

## 2024-12-13 DIAGNOSIS — G89.29 CHRONIC MIDLINE LOW BACK PAIN WITHOUT SCIATICA: ICD-10-CM

## 2024-12-13 DIAGNOSIS — M54.50 CHRONIC MIDLINE LOW BACK PAIN WITHOUT SCIATICA: ICD-10-CM

## 2024-12-13 DIAGNOSIS — F32.89 OTHER DEPRESSION: ICD-10-CM

## 2024-12-13 DIAGNOSIS — M62.838 MUSCLE SPASM: Primary | ICD-10-CM

## 2024-12-13 DIAGNOSIS — F41.9 ANXIETY: ICD-10-CM

## 2024-12-13 RX ORDER — METHOCARBAMOL 500 MG/1
500 TABLET, FILM COATED ORAL 4 TIMES DAILY
COMMUNITY
End: 2024-12-13 | Stop reason: SDUPTHER

## 2024-12-13 RX ORDER — METHOCARBAMOL 500 MG/1
500 TABLET, FILM COATED ORAL 4 TIMES DAILY
Qty: 120 TABLET | Refills: 1 | Status: SHIPPED | OUTPATIENT
Start: 2024-12-13

## 2024-12-13 RX ORDER — BUSPIRONE HYDROCHLORIDE 10 MG/1
10 TABLET ORAL 3 TIMES DAILY
Qty: 90 TABLET | Refills: 2 | Status: SHIPPED | OUTPATIENT
Start: 2024-12-13

## 2024-12-13 RX ORDER — MELOXICAM 7.5 MG/1
7.5 TABLET ORAL DAILY
Qty: 30 TABLET | Refills: 1 | Status: SHIPPED | OUTPATIENT
Start: 2024-12-13

## 2024-12-13 RX ORDER — ANASTROZOLE 1 MG/1
TABLET ORAL DAILY
COMMUNITY

## 2024-12-13 RX ORDER — BUPROPION HYDROCHLORIDE 100 MG/1
300 TABLET ORAL DAILY
COMMUNITY
End: 2024-12-13

## 2024-12-13 RX ORDER — BUSPIRONE HYDROCHLORIDE 10 MG/1
10 TABLET ORAL 3 TIMES DAILY
COMMUNITY
End: 2024-12-13 | Stop reason: SDUPTHER

## 2024-12-13 RX ORDER — BUPROPION HYDROCHLORIDE 300 MG/1
300 TABLET ORAL DAILY
Qty: 30 TABLET | Refills: 1 | Status: SHIPPED | OUTPATIENT
Start: 2024-12-13

## 2024-12-13 NOTE — PROGRESS NOTES
"Chief Complaint  Anxiety (Pt is here for a follow up )    Subjective        Boo Blackburn presents to [facility]  Anxiety       presents today for a follow up on chronic issues.      He has anxiety.  He is on Buspar.  He says this works well for him.  He asks what else we could do for anxiety.  He was previously on benzo's.  He has a history of heroine and opiate abuse.  I offered we could try SSRI's or SNRI's.  He wants to stay with his current regimen.      He has depression.  He is on Wellbutrin.  He says this works well for him.    He has hypogonadism.  He is on TRT via Dr. Jerry.  He is also on Arimidex.  He asks if we will take this over.  I explained I think it's best he continue to see Dr. Jerry.      He requests refills on Mobic and and Robaxin for his chronic back pain.    Objective   Vital Signs:  /76 (BP Location: Left arm, Patient Position: Sitting, Cuff Size: Adult)   Pulse 94   Temp 98 °F (36.7 °C) (Temporal)   Resp 18   Ht 175.3 cm (69.02\")   Wt 86.2 kg (190 lb)   SpO2 99%   BMI 28.05 kg/m²   Estimated body mass index is 28.05 kg/m² as calculated from the following:    Height as of this encounter: 175.3 cm (69.02\").    Weight as of this encounter: 86.2 kg (190 lb).    BMI is >= 25 and <30. (Overweight) The following options were offered after discussion;: weight loss educational material (shared in after visit summary)      Physical Exam  Vitals reviewed.   Constitutional:       General: He is not in acute distress.     Appearance: Normal appearance. He is normal weight. He is not ill-appearing, toxic-appearing or diaphoretic.   HENT:      Head: Normocephalic and atraumatic.      Right Ear: External ear normal.      Left Ear: External ear normal.      Nose: Nose normal.   Eyes:      Extraocular Movements: Extraocular movements intact.   Cardiovascular:      Rate and Rhythm: Normal rate and regular rhythm.   Pulmonary:      Effort: " Pulmonary effort is normal. No respiratory distress.      Breath sounds: Normal breath sounds.   Skin:     General: Skin is warm and dry.      Coloration: Skin is not jaundiced or pale.   Neurological:      Mental Status: He is alert and oriented to person, place, and time.   Psychiatric:         Mood and Affect: Mood normal.         Behavior: Behavior normal.         Thought Content: Thought content normal.         Judgment: Judgment normal.            Result Review :                Assessment and Plan   Diagnoses and all orders for this visit:    1. Muscle spasm (Primary)  -     methocarbamol (ROBAXIN) 500 MG tablet; Take 1 tablet by mouth 4 (Four) Times a Day.  Dispense: 120 tablet; Refill: 1    2. Chronic midline low back pain without sciatica  -     busPIRone (BUSPAR) 10 MG tablet; Take 1 tablet by mouth 3 (Three) Times a Day.  Dispense: 90 tablet; Refill: 2  -     methocarbamol (ROBAXIN) 500 MG tablet; Take 1 tablet by mouth 4 (Four) Times a Day.  Dispense: 120 tablet; Refill: 1  -     meloxicam (Mobic) 7.5 MG tablet; Take 1 tablet by mouth Daily.  Dispense: 30 tablet; Refill: 1    3. Anxiety  -     busPIRone (BUSPAR) 10 MG tablet; Take 1 tablet by mouth 3 (Three) Times a Day.  Dispense: 90 tablet; Refill: 2    4. Other depression  -     buPROPion XL (Wellbutrin XL) 300 MG 24 hr tablet; Take 1 tablet by mouth Daily.  Dispense: 30 tablet; Refill: 1             Follow Up   Return for Recheck, Annual physical.  Patient was given instructions and counseling regarding his condition or for health maintenance advice. Please see specific information pulled into the AVS if appropriate.              detailed exam

## 2025-04-15 ENCOUNTER — APPOINTMENT (OUTPATIENT)
Dept: ULTRASOUND IMAGING | Facility: HOSPITAL | Age: 30
End: 2025-04-15
Payer: MEDICAID

## 2025-04-15 ENCOUNTER — APPOINTMENT (OUTPATIENT)
Dept: OTHER | Facility: HOSPITAL | Age: 30
End: 2025-04-15
Payer: MEDICAID

## 2025-04-15 ENCOUNTER — TELEPHONE (OUTPATIENT)
Dept: FAMILY MEDICINE CLINIC | Facility: CLINIC | Age: 30
End: 2025-04-15

## 2025-04-15 ENCOUNTER — APPOINTMENT (OUTPATIENT)
Dept: CT IMAGING | Facility: HOSPITAL | Age: 30
End: 2025-04-15
Payer: MEDICAID

## 2025-04-15 ENCOUNTER — HOSPITAL ENCOUNTER (OUTPATIENT)
Facility: HOSPITAL | Age: 30
Setting detail: OBSERVATION
Discharge: HOME | End: 2025-04-16
Attending: EMERGENCY MEDICINE | Admitting: INTERNAL MEDICINE
Payer: MEDICAID

## 2025-04-15 DIAGNOSIS — R20.0 NUMBNESS AND TINGLING OF RIGHT ARM: ICD-10-CM

## 2025-04-15 DIAGNOSIS — R20.2 NUMBNESS AND TINGLING OF RIGHT ARM: ICD-10-CM

## 2025-04-15 DIAGNOSIS — R29.898 LEFT ARM WEAKNESS: Primary | ICD-10-CM

## 2025-04-15 LAB
ALBUMIN SERPL-MCNC: 4.7 G/DL (ref 3.5–5.2)
ALBUMIN/GLOB SERPL: 1.9 G/DL
ALP SERPL-CCNC: 64 U/L (ref 39–117)
ALT SERPL W P-5'-P-CCNC: 13 U/L (ref 1–41)
ANION GAP SERPL CALCULATED.3IONS-SCNC: 11 MMOL/L (ref 5–15)
AST SERPL-CCNC: 13 U/L (ref 1–40)
BASOPHILS # BLD AUTO: 0.05 10*3/MM3 (ref 0–0.2)
BASOPHILS NFR BLD AUTO: 0.7 % (ref 0–1.5)
BILIRUB SERPL-MCNC: 0.6 MG/DL (ref 0–1.2)
BUN SERPL-MCNC: 10 MG/DL (ref 6–20)
BUN/CREAT SERPL: 11 (ref 7–25)
CALCIUM SPEC-SCNC: 9.9 MG/DL (ref 8.6–10.5)
CHLORIDE SERPL-SCNC: 105 MMOL/L (ref 98–107)
CO2 SERPL-SCNC: 26 MMOL/L (ref 22–29)
CREAT SERPL-MCNC: 0.91 MG/DL (ref 0.76–1.27)
D DIMER PPP FEU-MCNC: <0.27 MCGFEU/ML (ref 0–0.5)
DEPRECATED RDW RBC AUTO: 42.3 FL (ref 37–54)
EGFRCR SERPLBLD CKD-EPI 2021: 117 ML/MIN/1.73
EOSINOPHIL # BLD AUTO: 0.02 10*3/MM3 (ref 0–0.4)
EOSINOPHIL NFR BLD AUTO: 0.3 % (ref 0.3–6.2)
ERYTHROCYTE [DISTWIDTH] IN BLOOD BY AUTOMATED COUNT: 12 % (ref 12.3–15.4)
GLOBULIN UR ELPH-MCNC: 2.5 GM/DL
GLUCOSE SERPL-MCNC: 118 MG/DL (ref 65–99)
HCT VFR BLD AUTO: 43.6 % (ref 37.5–51)
HGB BLD-MCNC: 14.4 G/DL (ref 13–17.7)
IMM GRANULOCYTES # BLD AUTO: 0.02 10*3/MM3 (ref 0–0.05)
IMM GRANULOCYTES NFR BLD AUTO: 0.3 % (ref 0–0.5)
LYMPHOCYTES # BLD AUTO: 1.74 10*3/MM3 (ref 0.7–3.1)
LYMPHOCYTES NFR BLD AUTO: 25.2 % (ref 19.6–45.3)
MCH RBC QN AUTO: 31 PG (ref 26.6–33)
MCHC RBC AUTO-ENTMCNC: 33 G/DL (ref 31.5–35.7)
MCV RBC AUTO: 93.8 FL (ref 79–97)
MONOCYTES # BLD AUTO: 0.57 10*3/MM3 (ref 0.1–0.9)
MONOCYTES NFR BLD AUTO: 8.2 % (ref 5–12)
NEUTROPHILS NFR BLD AUTO: 4.51 10*3/MM3 (ref 1.7–7)
NEUTROPHILS NFR BLD AUTO: 65.3 % (ref 42.7–76)
NRBC BLD AUTO-RTO: 0 /100 WBC (ref 0–0.2)
PLATELET # BLD AUTO: 305 10*3/MM3 (ref 140–450)
PMV BLD AUTO: 9.3 FL (ref 6–12)
POTASSIUM SERPL-SCNC: 4.1 MMOL/L (ref 3.5–5.2)
PROT SERPL-MCNC: 7.2 G/DL (ref 6–8.5)
RBC # BLD AUTO: 4.65 10*6/MM3 (ref 4.14–5.8)
SODIUM SERPL-SCNC: 142 MMOL/L (ref 136–145)
WBC NRBC COR # BLD AUTO: 6.91 10*3/MM3 (ref 3.4–10.8)

## 2025-04-15 PROCEDURE — 80053 COMPREHEN METABOLIC PANEL: CPT | Performed by: EMERGENCY MEDICINE

## 2025-04-15 PROCEDURE — 85379 FIBRIN DEGRADATION QUANT: CPT | Performed by: EMERGENCY MEDICINE

## 2025-04-15 PROCEDURE — 93971 EXTREMITY STUDY: CPT

## 2025-04-15 PROCEDURE — G0378 HOSPITAL OBSERVATION PER HR: HCPCS

## 2025-04-15 PROCEDURE — 85025 COMPLETE CBC W/AUTO DIFF WBC: CPT | Performed by: EMERGENCY MEDICINE

## 2025-04-15 PROCEDURE — 99285 EMERGENCY DEPT VISIT HI MDM: CPT

## 2025-04-15 PROCEDURE — 36415 COLL VENOUS BLD VENIPUNCTURE: CPT

## 2025-04-15 PROCEDURE — 25010000002 KETOROLAC TROMETHAMINE PER 15 MG

## 2025-04-15 PROCEDURE — 96372 THER/PROPH/DIAG INJ SC/IM: CPT

## 2025-04-15 PROCEDURE — 70450 CT HEAD/BRAIN W/O DYE: CPT

## 2025-04-15 PROCEDURE — 72125 CT NECK SPINE W/O DYE: CPT

## 2025-04-15 PROCEDURE — 93971 EXTREMITY STUDY: CPT | Performed by: SURGERY

## 2025-04-15 RX ORDER — KETOROLAC TROMETHAMINE 15 MG/ML
15 INJECTION, SOLUTION INTRAMUSCULAR; INTRAVENOUS ONCE
Status: DISCONTINUED | OUTPATIENT
Start: 2025-04-15 | End: 2025-04-15

## 2025-04-15 RX ORDER — KETOROLAC TROMETHAMINE 15 MG/ML
15 INJECTION, SOLUTION INTRAMUSCULAR; INTRAVENOUS ONCE
Status: COMPLETED | OUTPATIENT
Start: 2025-04-15 | End: 2025-04-15

## 2025-04-15 RX ORDER — HYDROXYZINE HYDROCHLORIDE 25 MG/1
25 TABLET, FILM COATED ORAL ONCE AS NEEDED
Status: COMPLETED | OUTPATIENT
Start: 2025-04-15 | End: 2025-04-16

## 2025-04-15 RX ORDER — ACETAMINOPHEN 500 MG
1000 TABLET ORAL ONCE
Status: COMPLETED | OUTPATIENT
Start: 2025-04-15 | End: 2025-04-15

## 2025-04-15 RX ADMIN — ACETAMINOPHEN TAB 500 MG 1000 MG: 500 TAB at 20:52

## 2025-04-15 RX ADMIN — KETOROLAC TROMETHAMINE 15 MG: 15 INJECTION, SOLUTION INTRAMUSCULAR; INTRAVENOUS at 23:01

## 2025-04-15 NOTE — ED PROVIDER NOTES
"EMERGENCY DEPARTMENT ATTENDING NOTE    Patient Name: Boo Blackburn    Chief Complaint   Patient presents with    Arm Pain    Numbness    Neck Pain       PATIENT PRESENTATION:  Boo Blackburn is a 29 y.o. male with PMH significant for myocardial bridge, A-fib, heart attack who presents to the ED with numbness and tingling diffusely through his right upper extremity, neck pain, and slight swelling in his hand.  Evaluated at [facility] with a CT head, C-spine, and x-ray of the shoulder.  Mother is with him provided the disc so they will be added to the system.  Patient has not had any chest pain or shortness of breath.  Patient is not had any speech changes, vision changes, facial numbness, balance issues, lower extremity symptoms.  Also endorses cervical neck pain.  Per prior ED documentation patient was recommended for neurology evaluation and family requested discharge from the ER at [facility] this afternoon.  Mother had requested to take the patient to a larger facility and the ER provider had not recommended any specific fill facility prior to the documentation provided.  Patient denies any IV drug use or fevers.  No recent travel, recent surgeries, history of cancer, family history of clotting disorders, hemoptysis, pleuritic symptoms, immobilization, family history of clots.      PHYSICAL EXAM:   VS: /88 (BP Location: Left arm, Patient Position: Sitting)   Pulse 120   Temp 97.9 °F (36.6 °C) (Oral)   Resp 18   Ht 175.3 cm (69\")   Wt 77.8 kg (171 lb 8 oz)   SpO2 100%   BMI 25.33 kg/m²   GENERAL: well-nourished, well-developed, awake, alert, no acute distress, nontoxic appearing, comfortable  EYES: PERRL, sclerae anicteric, extraocular movements grossly intact, symmetric lids  EARS, NOSE, MOUTH, THROAT: atraumatic external nose and ears, moist mucous membranes  NECK: symmetric, trachea midline  RESPIRATORY: unlabored respiratory effort, clear to auscultation bilaterally, good air " movement  CARDIOVASCULAR: no murmurs, peripheral pulses 2+ and equal in all extremities  GI: soft, nontender, nondistended  MUSCULOSKELETAL/EXTREMITIES: extremities without obvious deformity  SKIN: warm and dry with no obvious rashes  NEUROLOGIC: Cranial nerves II through XII intact, weakness with right upper extremity flexion at the elbow, wrist extension flexion, and  all 3 out of 5 strength and subjective numbness diffusely through right upper extremity but intact sensation.  Intact distal pulses  PSYCHIATRIC: alert, pleasant and cooperative. Appropriate mood and affect.      MEDICAL DECISION MAKING:    Boo Blackburn is a 29 y.o. male who presented to the ED with right upper extremity numbness and tingling and weakness for 3 days in addition to neck pain    Procedures    Differential Diagnosis Considered: DVT, arterial emboli, cervical radiculopathy, spinal epidural abscess    Labs Ordered:  Labs Reviewed   COMPREHENSIVE METABOLIC PANEL - Abnormal; Notable for the following components:       Result Value    Glucose 118 (*)     All other components within normal limits    Narrative:     GFR Categories in Chronic Kidney Disease (CKD)      GFR Category          GFR (mL/min/1.73)    Interpretation  G1                     90 or greater         Normal or high (1)  G2                      60-89                Mild decrease (1)  G3a                   45-59                Mild to moderate decrease  G3b                   30-44                Moderate to severe decrease  G4                    15-29                Severe decrease  G5                    14 or less           Kidney failure          (1)In the absence of evidence of kidney disease, neither GFR category G1 or G2 fulfill the criteria for CKD.    eGFR calculation 2021 CKD-EPI creatinine equation, which does not include race as a factor   CBC WITH AUTO DIFFERENTIAL - Abnormal; Notable for the following components:    RDW 12.0 (*)     All other components within  "normal limits   D-DIMER, QUANTITATIVE - Normal    Narrative:     According to the assay 's published package insert, a normal (<0.50 MCGFEU/mL) D-dimer result in conjunction with a non-high clinical probability assessment, excludes deep vein thrombosis (DVT) and pulmonary embolism (PE) with high sensitivity.    D-dimer values increase with age and this can make VTE exclusion of an older population difficult. To address this, the [employer] College [employer], based on best available evidence and recent guidelines, recommends that clinicians use age-adjusted D-dimer thresholds in patients greater than 50 years of age with: a) a low probability of PE who do not meet all Pulmonary Embolism Rule Out Criteria, or b) in those with intermediate probability of PE.   The formula for an age-adjusted D-dimer cut-off is \"age/100\".  For example, a 60 year old patient would have an age-adjusted cut-off of 0.60 MCGFEU/mL and an 80 year old 0.80 MCGFEU/mL.   CBC AND DIFFERENTIAL    Narrative:     The following orders were created for panel order CBC & Differential.  Procedure                               Abnormality         Status                     ---------                               -----------         ------                     CBC Auto Differential[[id number]]         Abnormal            Final result                 Please view results for these tests on the individual orders.        Imaging Ordered:   CT Head Without Contrast   Final Result   1. No acute intracranial process.               This report was signed and finalized on 4/15/2025 9:14 PM by Dr. Fabián Lim MD.          CT Cervical Spine Without Contrast   Final Result   1. Normal alignment. No occult fracture. Disc space heights well   maintained.   2. No central stenosis. Foraminal narrowing is noted in the mid and   lower cervical spine as described in detail at each disc level above.               This report was signed and finalized on " 4/15/2025 9:26 PM by Dr. Fabián Lim MD.          XR Outside Films   Final Result      CT Outside Head   Final Result      XR Outside Films   Final Result      US Venous Doppler Upper Extremity Right (duplex)    (Results Pending)       Internal chart review:   Past Medical History:   Diagnosis Date    Afib     Heart attack     mild    Myocardial bridge        History reviewed. No pertinent surgical history.    No Known Allergies      Current Facility-Administered Medications:     ketorolac (TORADOL) injection 15 mg, 15 mg, Intravenous, Once, Serjio Galvan MD    Lidocaine Viscous HCl (XYLOCAINE) 2 % solution 5 mL, 5 mL, Mouth/Throat, Q3H PRN, Geneva Mayfield, DNP, APRN    Current Outpatient Medications:     anastrozole (ARIMIDEX) 1 MG tablet, Take  by mouth Daily., Disp: , Rfl:     azithromycin (Zithromax Z-Srini) 250 MG tablet, Take 2 tablets by mouth on day 1, then 1 tablet daily on days 2-5, Disp: 6 tablet, Rfl: 0    buprenorphine-naloxone (SUBOXONE) 8-2 MG per SL tablet, Place 1 tablet under the tongue Daily. Patient states he has not been taking this medication lately, Disp: , Rfl: 0    buPROPion XL (Wellbutrin XL) 300 MG 24 hr tablet, Take 1 tablet by mouth Daily., Disp: 30 tablet, Rfl: 1    busPIRone (BUSPAR) 10 MG tablet, Take 1 tablet by mouth 3 (Three) Times a Day., Disp: 90 tablet, Rfl: 2    meloxicam (Mobic) 7.5 MG tablet, Take 1 tablet by mouth Daily., Disp: 30 tablet, Rfl: 1    methocarbamol (ROBAXIN) 500 MG tablet, Take 1 tablet by mouth 4 (Four) Times a Day., Disp: 120 tablet, Rfl: 1    Testosterone Cypionate (DEPOTESTOTERONE CYPIONATE) 200 MG/ML injection, Inject 1 mL into the appropriate muscle as directed by prescriber Every 14 (Fourteen) Days., Disp: , Rfl:     tiZANidine (ZANAFLEX) 2 MG tablet, Take 1-2 tablets by mouth Every 8 (Eight) Hours As Needed for Muscle Spasms., Disp: 20 tablet, Rfl: 0    External documents reviewed: Reviewed PCM documentation when he said that he has had 3 days  of arm numbness and discoloration of his fingers.  Patient was told to go to the ER due to the discoloration and concerns.    My imaging interpretation: See below    Discussed with: Patient and mother    Shared decision making: Regarding hobs with neurology evaluation or outpatient neurology follow-up with difficulty getting appointment    ED Course and Re-evaluation:     ED Course as of 04/15/25 2225   Tue Apr 15, 2025   1841 Attempt to assess patient and he had walked out per nursing.  Will give him up to to come back, otherwise he left AMA. [JJ]   1944 Called CT and outside images to be read by in-house radiology. [JJ]   1945 Negative dimer and normal white count low suspicion for infection or clot.  Pending DVT ultrasound report will discuss imaging and symptoms with neurology before disposition. [JJ]   1955 Radiology cannot formally interpret studies but will review them.  If we need formal interpretation will require repeat imaging. [JJ]   2013 Unable to get formal interpretations on patient's imaging outside of the facility.  Will repeat CT head and C-spine given objective deficits. [JJ]   2043 Sheryl Moore on 4/15/2025  8:19 PM CDT  RUEV: No evidence of DVT seen at this time. Final report pending.     [JJ]   2133 CT head and C-spine without acute process.  Will discuss with neurology given objective weakness, no evidence of DVT, no pain or symptoms consistent with a vascular injury and no signs of cervical radiculopathy on CT. [JJ]   2144 Won't have follow up but won't be in near future. [JJ]   2145 Discussed with Dr. Coker.  No neurology appointments available in the near future in the local area.  Concerns for possible follow-up.  Offered to have patient admitted for further evaluation workup.  Patient has a broad differential could be causing his symptoms to include trans myelitis.  Will discussed with patient and offer admission. [JJ]   2153 Shared decision making with patient and offered admission  for observation and neurology evaluation in the morning versus attempting to follow-up outpatient with a neurologist.  Due to limited access patient would like to be observed and have evaluation in the morning.  Believe this is reasonable.  Will discuss with the hospitalist. [JJ]      ED Course User Index  [JJ] Serjio Galvan MD        ED Critical Care time:     ED Diagnosis:  (R29.898) Left arm weakness     Disposition: Admission for observation and neurology evaluation      Signed:  Serjio Galvan MD  Emergency Medicine Physician    Please note that portions of this note were completed with a voice recognition program.      Serjio Galvan MD  04/15/25 [id number]

## 2025-04-15 NOTE — TELEPHONE ENCOUNTER
Spoke with patient who reports that his arm has felt numb/tingly for three days. Also reports that his fingers are discolored. Advised that he needs to seek ED evaluation for symptoms.

## 2025-04-16 ENCOUNTER — APPOINTMENT (OUTPATIENT)
Dept: MRI IMAGING | Facility: HOSPITAL | Age: 30
End: 2025-04-16
Payer: MEDICAID

## 2025-04-16 ENCOUNTER — READMISSION MANAGEMENT (OUTPATIENT)
Dept: CALL CENTER | Facility: HOSPITAL | Age: 30
End: 2025-04-16
Payer: MEDICAID

## 2025-04-16 VITALS
TEMPERATURE: 97.7 F | DIASTOLIC BLOOD PRESSURE: 73 MMHG | HEIGHT: 70 IN | OXYGEN SATURATION: 100 % | SYSTOLIC BLOOD PRESSURE: 134 MMHG | HEART RATE: 78 BPM | BODY MASS INDEX: 24.34 KG/M2 | WEIGHT: 170 LBS | RESPIRATION RATE: 18 BRPM

## 2025-04-16 PROBLEM — R20.2 NUMBNESS AND TINGLING OF RIGHT ARM: Status: ACTIVE | Noted: 2025-04-16

## 2025-04-16 PROBLEM — R20.0 NUMBNESS AND TINGLING OF RIGHT ARM: Status: ACTIVE | Noted: 2025-04-16

## 2025-04-16 LAB
AMPHET+METHAMPHET UR QL: NEGATIVE
AMPHETAMINES UR QL: NEGATIVE
BARBITURATES UR QL SCN: NEGATIVE
BENZODIAZ UR QL SCN: NEGATIVE
BUPRENORPHINE SERPL-MCNC: NEGATIVE NG/ML
CANNABINOIDS SERPL QL: NEGATIVE
COCAINE UR QL: NEGATIVE
FENTANYL UR-MCNC: NEGATIVE NG/ML
METHADONE UR QL SCN: NEGATIVE
OPIATES UR QL: NEGATIVE
OXYCODONE UR QL SCN: NEGATIVE
PCP UR QL SCN: NEGATIVE
TRICYCLICS UR QL SCN: NEGATIVE

## 2025-04-16 PROCEDURE — A9579 GAD-BASE MR CONTRAST NOS,1ML: HCPCS | Performed by: INTERNAL MEDICINE

## 2025-04-16 PROCEDURE — 25510000001 GADOPICLENOL 0.5 MMOL/ML SOLUTION: Performed by: INTERNAL MEDICINE

## 2025-04-16 PROCEDURE — G0378 HOSPITAL OBSERVATION PER HR: HCPCS

## 2025-04-16 PROCEDURE — 80307 DRUG TEST PRSMV CHEM ANLYZR: CPT

## 2025-04-16 PROCEDURE — 97161 PT EVAL LOW COMPLEX 20 MIN: CPT | Performed by: PHYSICAL THERAPIST

## 2025-04-16 PROCEDURE — 72141 MRI NECK SPINE W/O DYE: CPT

## 2025-04-16 PROCEDURE — 99204 OFFICE O/P NEW MOD 45 MIN: CPT | Performed by: PSYCHIATRY & NEUROLOGY

## 2025-04-16 PROCEDURE — 73220 MRI UPPR EXTREMITY W/O&W/DYE: CPT

## 2025-04-16 RX ORDER — LIDOCAINE 4 G/G
1 PATCH TOPICAL
Status: DISCONTINUED | OUTPATIENT
Start: 2025-04-16 | End: 2025-04-16 | Stop reason: HOSPADM

## 2025-04-16 RX ORDER — MELOXICAM 7.5 MG/1
15 TABLET ORAL ONCE AS NEEDED
Status: COMPLETED | OUTPATIENT
Start: 2025-04-16 | End: 2025-04-16

## 2025-04-16 RX ORDER — LEVETIRACETAM 500 MG/5ML
1000 INJECTION, SOLUTION, CONCENTRATE INTRAVENOUS ONCE
Status: DISCONTINUED | OUTPATIENT
Start: 2025-04-16 | End: 2025-04-16

## 2025-04-16 RX ORDER — SODIUM CHLORIDE 9 MG/ML
40 INJECTION, SOLUTION INTRAVENOUS AS NEEDED
Status: DISCONTINUED | OUTPATIENT
Start: 2025-04-16 | End: 2025-04-16 | Stop reason: HOSPADM

## 2025-04-16 RX ORDER — SODIUM CHLORIDE 0.9 % (FLUSH) 0.9 %
10 SYRINGE (ML) INJECTION EVERY 12 HOURS SCHEDULED
Status: DISCONTINUED | OUTPATIENT
Start: 2025-04-16 | End: 2025-04-16 | Stop reason: HOSPADM

## 2025-04-16 RX ORDER — IBUPROFEN 400 MG/1
200 TABLET, FILM COATED ORAL EVERY 4 HOURS PRN
Status: DISCONTINUED | OUTPATIENT
Start: 2025-04-16 | End: 2025-04-16 | Stop reason: HOSPADM

## 2025-04-16 RX ORDER — ALPRAZOLAM 0.5 MG
0.5 TABLET ORAL ONCE AS NEEDED
Status: COMPLETED | OUTPATIENT
Start: 2025-04-16 | End: 2025-04-16

## 2025-04-16 RX ORDER — GABAPENTIN 300 MG/1
300 CAPSULE ORAL EVERY 8 HOURS SCHEDULED
Status: DISCONTINUED | OUTPATIENT
Start: 2025-04-16 | End: 2025-04-16 | Stop reason: HOSPADM

## 2025-04-16 RX ORDER — LEVETIRACETAM 500 MG/5ML
500 INJECTION, SOLUTION, CONCENTRATE INTRAVENOUS EVERY 12 HOURS SCHEDULED
Status: DISCONTINUED | OUTPATIENT
Start: 2025-04-16 | End: 2025-04-16

## 2025-04-16 RX ORDER — BUPROPION HYDROCHLORIDE 150 MG/1
300 TABLET ORAL DAILY
Status: DISCONTINUED | OUTPATIENT
Start: 2025-04-17 | End: 2025-04-16 | Stop reason: HOSPADM

## 2025-04-16 RX ORDER — GABAPENTIN 100 MG/1
100 CAPSULE ORAL 3 TIMES DAILY
Qty: 90 CAPSULE | Refills: 1 | Status: SHIPPED | OUTPATIENT
Start: 2025-04-16 | End: 2026-04-16

## 2025-04-16 RX ORDER — SODIUM CHLORIDE 0.9 % (FLUSH) 0.9 %
10 SYRINGE (ML) INJECTION AS NEEDED
Status: DISCONTINUED | OUTPATIENT
Start: 2025-04-16 | End: 2025-04-16 | Stop reason: HOSPADM

## 2025-04-16 RX ADMIN — GADOPICLENOL 8 ML: 485.1 INJECTION INTRAVENOUS at 11:42

## 2025-04-16 RX ADMIN — ALPRAZOLAM 0.5 MG: 0.5 TABLET ORAL at 05:08

## 2025-04-16 RX ADMIN — MELOXICAM 15 MG: 7.5 TABLET ORAL at 04:05

## 2025-04-16 RX ADMIN — GABAPENTIN 300 MG: 300 CAPSULE ORAL at 10:11

## 2025-04-16 RX ADMIN — NICOTINE 1 PATCH: 7 PATCH, EXTENDED RELEASE TRANSDERMAL at 00:26

## 2025-04-16 RX ADMIN — HYDROXYZINE HYDROCHLORIDE 25 MG: 25 TABLET ORAL at 00:26

## 2025-04-16 RX ADMIN — TIZANIDINE 4 MG: 4 TABLET ORAL at 02:16

## 2025-04-16 RX ADMIN — LIDOCAINE 1 PATCH: 4 PATCH TOPICAL at 02:16

## 2025-04-16 RX ADMIN — IBUPROFEN 200 MG: 400 TABLET, FILM COATED ORAL at 09:03

## 2025-04-16 NOTE — ED NOTES
Urine specimen cup at bedside and instructions given to patient to provide a sample when he's able. Patient acknowledged.

## 2025-04-16 NOTE — PLAN OF CARE
Goal Outcome Evaluation:  Plan of Care Reviewed With: patient        Progress: improving  Outcome Evaluation: PT evaluation completed. Pt found sleeping in bed upon therapist arrival, agreeable to therapy, orientedx4, no c/o of pain. Pt reports feeling very tired/fatigue after pain medication given. Pt performed supine to sitting EOB with supervision and HOB elevated, bedrails. Pt demo 5/5 strength grossly in bilateral LEs and L UE. Pt demo strength deficits with inability to rise R UE against gravity and sensation deficits in the R UE in T1, C6, C7 reported diminisghed from the L UE. Pt demo AROM  in finger abductors, thumb flexion/extension, and 30% AROM in wrist flexion and demo ability to touch R thumb to each phalanx except the inability to touch thumb to pinky finger; perfomance indicates more of a fine motor strength impairment than coordination deficit. Pt reports some improvement in the hand than previously today. Pt performed STS t/f, and ambulated CGA/SBA short distance in room with no LOB and no gait deficits. Discussed with pt ability to perform ADLs with R UE impairments and pt reports has been able to perform ADLs independently with some difficulty but declines need for OT services right now. Pt educated on the importance of attending an outpatient Physical Therapy to regain/improve use of R UE as much function as possible. Pt reports that he does not have health insurance defer to social work to help with process. Recommended pt be seen during stay to improve R UE gross motor and fine motor hand strength/use. Recommended pt d/c to home with outpatient PT services to continue to improve R UE/hand strength,mobilty, and maximize function.    Anticipated Discharge Disposition (PT): home with outpatient therapy services

## 2025-04-16 NOTE — THERAPY EVALUATION
Patient Name: Boo Blackburn  : 1995    MRN: [id number]                              Today's Date: 2025       Admit Date: 4/15/2025    Visit Dx:     ICD-10-CM ICD-9-CM   1. Left arm weakness  R29.898 729.89   2. Numbness and tingling of right arm  R20.0 782.0    R20.2      Patient Active Problem List   Diagnosis    Lung nodule    Nicotine dependence, cigarettes, uncomplicated    Acne vulgaris    Attention deficit hyperactivity disorder (ADHD)    Overweight    Arm weakness    Numbness and tingling of right arm     Past Medical History:   Diagnosis Date    Afib     Heart attack     mild    Myocardial bridge      History reviewed. No pertinent surgical history.   General Information       Row Name 25 1456          Physical Therapy Time and Intention    Document Type evaluation  Admission: R arm weakness/sensation deficits  -MS (r) MD (t) MS (c)     Mode of Treatment physical therapy;individual therapy  -MS (r) MD (t) MS (c)       Row Name 25 1452          General Information    Patient Profile Reviewed yes  -MS (r) MD (t) MS (c)     Prior Level of Function independent:;gait;transfer;ADL's;cooking;cleaning  -MS (r) MD (t) MS (c)     Existing Precautions/Restrictions fall  -MS (r) MD (t) MS (c)       Row Name 25 0545          Living Environment    Current Living Arrangements home  -MS (r) MD (t) MS (c)     People in Home alone  -MS (r) MD (t) MS (c)       Row Name 25 1453          Home Main Entrance    Number of Stairs, Main Entrance none  -MS (r) MD (t) MS (c)       Row Name 25 1454          Stairs Within Home, Primary    Number of Stairs, Within Home, Primary none  -MS (r) MD (t) MS (c)       Row Name 25 8699          Cognition    Orientation Status (Cognition) oriented x 4  -MS (r) MD (t) MS (c)       Row Name 25 6989          Safety Issues/Impairments Affecting Functional Mobility    Impairments Affecting Function (Mobility) sensation/sensory  awareness;grasp;strength  -MS (r) MD (t) MS (c)               User Key  (r) = Recorded By, (t) = Taken By, (c) = Cosigned By      Initials Name Provider Type    Sanna Edouard MANNY, PT, DPT, NCS Physical Therapist    Delicia Feng, PT Student PT Student                   Mobility       Row Name 04/16/25 1528          Bed Mobility    Bed Mobility supine-sit;sit-supine  -MS (r) MD (t) MS (c)     Supine-Sit [place] (Bed Mobility) supervision  -MS (r) MD (t) MS (c)     Sit-Supine [place] (Bed Mobility) supervision  -MS (r) MD (t) MS (c)       Row Name 04/16/25 1528          Sit-Stand Transfer    Sit-Stand [place] (Transfers) supervision  -MS (r) MD (t) MS (c)       Row Name 04/16/25 1528          Gait/Stairs (Locomotion)    [place] Level (Gait) contact guard;standby assist  -MS (r) MD (t) MS (c)     Patient was able to Ambulate yes  -MS (r) MD (t) MS (c)     Distance in Feet (Gait) 60  -MS (r) MD (t) MS (c)     Stairs, Impairments other (see comments)  -MS (r) MD (t) MS (c)     Comment, (Gait/Stairs) Pt amb 60' in room with CGA/SBA using a reciprocal gait pattern without a LOB.  -MS (r) MD (t) MS (c)               User Key  (r) = Recorded By, (t) = Taken By, (c) = Cosigned By      Initials Name Provider Type    Sanna Edouard MANNY, PT, DPT, NCS Physical Therapist    Delicia Feng, PT Student PT Student                   Obj/Interventions       Row Name 04/16/25 1530          Range of Motion Comprehensive    General Range of Motion bilateral lower extremity ROM WFL;other (see comments)  -MS (r) MD (t) MS (c)     Comment, General Range of Motion Pt R UE unable to move shoulder flexsors, elbow extensors/flexors activity against gravity with PROM WFL. Pt demo R finger abduction, thumb extension, flexion 60% of AROM and R wrist flexion 30% of AROM.  -MS (r) MD (t) MS (c)       Row Name 04/16/25 1530          Strength Comprehensive (MMT)    General Manual Muscle Testing (MMT) Assessment other (see  comments)  -MS (r) MD (t) MS (c)     Comment, General Manual Muscle Testing (MMT) Assessment Pt demo bilateral LE grossly 5/5 strength and L UE grossly 5/5 strength. Pt demo R finger abduction, thumb flexion/extension 3/5 strength and demo wrist flexion 2/5 strength. Pt unable to lift R shoulder flexors, elbow flexors/extensors activiely against gravity 0/5 strength.  -MS (r) MD (t) MS (c)       Row Name 04/16/25 1530          Motor Skills    Motor Skills coordination  -MS (r) MD (t) MS (c)     Coordination other (see comments);fine motor deficit;gross motor deficit;right;minimal impairment  Pt able to move R thumb to each finger with minimal impairment and unable to touch R thumb to pinky finger. Based on performance more of a strength than coordination impairment.  -MS (r) MD (t) MS (c)       Row Name 04/16/25 1530          Balance    Balance Assessment sitting static balance;sitting dynamic balance;standing static balance;standing dynamic balance  -MS (r) MD (t) MS (c)     Static Sitting Balance supervision  -MS (r) MD (t) MS (c)     Dynamic Sitting Balance supervision  -MS (r) MD (t) MS (c)     Position, Sitting Balance unsupported  -MS (r) MD (t) MS (c)     Static Standing Balance contact guard;standby assist  -MS (r) MD (t) MS (c)     Dynamic Standing Balance standby assist;contact guard  -MS (r) MD (t) MS (c)     Position/Device Used, Standing Balance unsupported  -MS (r) MD (t) MS (c)       Row Name 04/16/25 1530          Sensory Assessment (Somatosensory)    Sensory Assessment (Somatosensory) LE sensation intact;other (see comments)  Pt demo sensation deficits in R UE in T1,C7,C6 compared L UE to light touch.  -MS (r) MD (t) MS (c)               User Key  (r) = Recorded By, (t) = Taken By, (c) = Cosigned By      Initials Name Provider Type    Sanna Edouard, PT, DPT, NCS Physical Therapist    Delicia Feng, PT Student PT Student                   Goals/Plan       Row Name 04/16/25 5683          Bed  Mobility Goal 1 (PT)    Activity/Assistive Device (Bed Mobility Goal 1, PT) supine to sit;sit to supine;other (see comments)  head of the bed in neutral  -MS (r) MD (t) MS (c)     [place] Level/Cues Needed (Bed Mobility Goal 1, PT) independent  -MS (r) MD (t) MS (c)     Time Frame (Bed Mobility Goal 1, PT) long term goal (LTG);10 days  -MS (r) MD (t) MS (c)     Progress/Outcomes (Bed Mobility Goal 1, PT) new goal  -MS (r) MD (t) MS (c)       Row Name 04/16/25 3826          ROM Goal 1 (PT)    ROM Goal 1 (PT) Pt will be able to lift R UE against gravity 20% of the AROM  -MS (r) MD (t) MS (c)     Time Frame (ROM Goal 1, PT) long-term goal (LTG)  -MS (r) MD (t) MS (c)     Progress/Outcome (ROM Goal 1, PT) new goal  -MS (r) MD (t) MS (c)       Row Name 04/16/25 1305          Problem Specific Goal 1 (PT)    Problem Specific Goal 1 (PT) Pt will be able to move R wrist 50% of AROM in flexion and extension  -MS (r) MD (t) MS (c)     Time Frame (Problem Specific Goal 1, PT) long-term goal (LTG);1 week  -MS (r) MD (t) MS (c)     Progress/Outcome (Problem Specific Goal 1, PT) new goal  -MS (r) MD (t) MS (c)       Row Name 04/16/25 3648          Therapy Assessment/Plan (PT)    Planned Therapy Interventions (PT) bed mobility training;neuromuscular re-education;patient/family education;ROM (range of motion);strengthening;motor coordination training;other (see comments)  interventions for the R UE  -MS (r) MD (t) MS (c)               User Key  (r) = Recorded By, (t) = Taken By, (c) = Cosigned By      Initials Name Provider Type    Sanna Edouard, PT, DPT, NCS Physical Therapist    Delicia Feng, PT Student PT Student                   Clinical Impression       Row Name 04/16/25 4509          Pain    Pretreatment Pain Rating 0/10 - no pain  -MS (r) MD (t) MS (c)       Row Name 04/16/25 1530          Plan of Care Review    Plan of Care Reviewed With patient  -MS (r) MD (t) MS (c)     Progress improving  -MS (smiran) MD (t) MS  (c)     Outcome Evaluation PT evaluation completed. Pt found sleeping in bed upon therapist arrival, agreeable to therapy, orientedx4, no c/o of pain. Pt reports feeling very tired/fatigue after pain medication given. Pt performed supine to sitting EOB with supervision and HOB elevated, bedrails. Pt demo 5/5 strength grossly in bilateral LEs and L UE. Pt demo strength deficits with inability to rise R UE against gravity and sensation deficits in the R UE in T1, C6, C7 reported diminisghed from the L UE. Pt demo AROM  in finger abductors, thumb flexion/extension, and 30% AROM in wrist flexion and demo ability to touch R thumb to each phalanx except the inability to touch thumb to pinky finger; perfomance indicates more of a fine motor strength impairment than coordination deficit. Pt reports some improvement in the hand than previously today. Pt performed STS t/f, and ambulated CGA/SBA short distance in room with no LOB and no gait deficits. Discussed with pt ability to perform ADLs with R UE impairments and pt reports has been able to perform ADLs independently with some difficulty but declines need for OT services right now. Pt educated on the importance of attending an outpatient Physical Therapy to regain/improve use of R UE as much function as possible. Pt reports that he does not have health insurance defer to social work to help with process. Recommended pt be seen during stay to improve R UE gross motor and fine motor hand strength/use. Recommended pt d/c to home with outpatient PT services to continue to improve R UE/hand strength,mobilty, and maximize function.  -MS MD MS celeste (r t))       Row Name 04/16/25 6331          Therapy Assessment/Plan (PT)    Criteria for Skilled Interventions Met (PT) yes;skilled treatment is necessary  -MS DURGA (r) (syl) MS (krzysztof)     Therapy Frequency (PT) 2 times/day  -MS MD lyndon (r)) MS alonso)     Predicted Duration of Therapy Intervention (PT) until discharge  -MS MD lyndon (r)) MS alonso)        Row Name 04/16/25 1539          Vital Signs    O2 Delivery Pre Treatment room air  -MS (r) MD (t) MS (c)     O2 Delivery Intra Treatment room air  -MS (r) MD (t) MS (c)     O2 Delivery Post Treatment room air  -MS (r) MD (t) MS (c)       Row Name 04/16/25 1539          Positioning and Restraints    Pre-Treatment Position in bed  -MS (r) MD (t) MS (c)     Post Treatment Position bed  -MS (r) MD (t) MS (c)     In Bed fowlers;call light within reach;with other staff  -MS (r) MD (t) MS (c)               User Key  (r) = Recorded By, (t) = Taken By, (c) = Cosigned By      Initials Name Provider Type    Sanna Edouard, PT, DPT, NCS Physical Therapist    Delicia Feng, PT Student PT Student                   Outcome Measures       Row Name 04/16/25 1555 04/16/25 0903       How much help from another person do you currently need...    Turning from your back to your side while in flat bed without using bedrails? 4  -MS (r) MD (t) MS (c) 4  -MF    Moving from lying on back to sitting on the side of a flat bed without bedrails? 3  -MS (r) MD (t) MS (c) 4  -MF    Moving to and from a bed to a chair (including a wheelchair)? 4  -MS (r) MD (t) MS (c) 4  -MF    Standing up from a chair using your arms (e.g., wheelchair, bedside chair)? 4  -MS (r) MD (t) MS (c) 4  -MF    Climbing 3-5 steps with a railing? 4  -MS (r) MD (t) MS (c) 4  -MF    To walk in hospital room? 4  -MS (r) MD (t) MS (c) 4  -MF    AM-PAC 6 Clicks Score (PT) 23  -MS (r) MD (t) 24  -MF    Highest Level of Mobility Goal 7 --> Walk 25 feet or more  -MS (r) MD (t) 8 --> Walked 250 feet or more  -MF      Row Name 04/16/25 155          Functional Assessment    Outcome Measure Options AM-PAC 6 Clicks Basic Mobility (PT)  -MS (r) MD (t) MS (c)               User Key  (r) = Recorded By, (t) = Taken By, (c) = Cosigned By      Initials Name Provider Type    Sanna Edouard, PT, DPT, NCS Physical Therapist    Miriam Chun, RN Registered Nurse    MD  Delicia Anderson, PT Student PT Student                                 Physical Therapy Education       Title: PT OT SLP Therapies (Done)       Topic: Physical Therapy (Done)       Point: Precautions (Done)       Learning Progress Summary            Patient Ben, KENNEDY, VU by MD at 4/16/2025 9553    Comment: Pt educated on the importance of further physical therapy services to continue to regain/improve maximal function of R UE/hand.                                      User Key       Initials Effective Dates Name Provider Type Discipline    MD 01/29/25 -  Delicia Anderson, PT Student PT Student PT                  PT Recommendation and Plan  Planned Therapy Interventions (PT): bed mobility training, neuromuscular re-education, patient/family education, ROM (range of motion), strengthening, motor coordination training, other (see comments) (interventions for the R UE)  Progress: improving  Outcome Evaluation: PT evaluation completed. Pt found sleeping in bed upon therapist arrival, agreeable to therapy, orientedx4, no c/o of pain. Pt reports feeling very tired/fatigue after pain medication given. Pt performed supine to sitting EOB with supervision and HOB elevated, bedrails. Pt demo 5/5 strength grossly in bilateral LEs and L UE. Pt demo strength deficits with inability to rise R UE against gravity and sensation deficits in the R UE in T1, C6, C7 reported diminisghed from the L UE. Pt demo AROM  in finger abductors, thumb flexion/extension, and 30% AROM in wrist flexion and demo ability to touch R thumb to each phalanx except the inability to touch thumb to pinky finger; perfomance indicates more of a fine motor strength impairment than coordination deficit. Pt reports some improvement in the hand than previously today. Pt performed STS t/f, and ambulated CGA/SBA short distance in room with no LOB and no gait deficits. Discussed with pt ability to perform ADLs with R UE impairments and pt reports has been able to  perform ADLs independently with some difficulty but declines need for OT services right now. Pt educated on the importance of attending an outpatient Physical Therapy to regain/improve use of R UE as much function as possible. Pt reports that he does not have health insurance defer to social work to help with process. Recommended pt be seen during stay to improve R UE gross motor and fine motor hand strength/use. Recommended pt d/c to home with outpatient PT services to continue to improve R UE/hand strength,mobilty, and maximize function.     Time Calculation:         PT Charges       Row Name 04/16/25 1557             Time Calculation    Start Time 1452  plus 10 minutes chart review  -MS (r) MD (t) MS (c)      Stop Time 1526  -MS (r) MD (t) MS (c)      Time Calculation (min) 34 min  -MS (r) MD (t)      PT Received On 04/16/25  -MS (r) MD (t) MS (c)      PT Goal Re-Cert Due Date 04/26/25  -MS (r) MD (t) MS (c)         Untimed Charges    PT Eval/Re-eval Minutes 44  -MS (r) MD (t) MS (c)         Total Minutes    Untimed Charges Total Minutes 44  -MS (r) MD (t)       Total Minutes 44  -MS (r) MD (t)                User Key  (r) = Recorded By, (t) = Taken By, (c) = Cosigned By      Initials Name Provider Type    Sanna Edouard R, PT, DPT, NCS Physical Therapist    Delicia Feng, PT Student PT Student                      PT G-Codes  Outcome Measure Options: AM-PAC 6 Clicks Basic Mobility (PT)  AM-PAC 6 Clicks Score (PT): 23  PT Discharge Summary  Anticipated Discharge Disposition (PT): home with outpatient therapy services    Delicia Anderson, PT Student  4/16/2025

## 2025-04-16 NOTE — DISCHARGE SUMMARY
[facility] Medicine Services  DISCHARGE SUMMARY       Date of Admission: 4/15/2025  Date of Discharge:  4/16/2025  Primary Care Physician: Geneva Mayfield, DNP, APRN    Presenting Problem/History of Present Illness:  Right upper extremity numbness and weakness     Arm Pain      Neck Pain            Final Discharge Diagnoses:  Active Hospital Problems    Diagnosis     **Arm weakness     Numbness and tingling of right arm     Nicotine dependence, cigarettes, uncomplicated        Consults:  Dr. Coker, Neurology    Procedures Performed: none    Pertinent Test Results: MRI, Venous ultrasound      Imaging Results (All)       Procedure Component Value Units Date/Time    MRI brachial plexus upper extremity right w wo contrast [[id number]] Collected: 04/16/25 1233     Updated: 04/16/25 1248    Narrative:      EXAMINATION: MRI BRACHIAL PLEXUS UPPER EXTREMITY RIGHT W WO CONTRAST-  4/16/2025 9:55 AM     HISTORY: right arm weakness and numbness; R29.898-Other symptoms and  signs involving the musculoskeletal system. Possible compression  brachial plexopathy on the RIGHT.     COMPARISON: MRI cervical spine 4/16/2025 and outside CT dated 4/15/2025     TECHNIQUE: Multiplanar, multiphasic MR images of the brachial plexus  were obtained before and after the intravenous infusion of 8 mL  intravenous Vueway contrast.     SPINAL CORD:  Normal caliber and signal of visualized spinal cord.     BRACHIAL PLEXUS:  Roots: Normal.  Trunks: Normal.  Divisions: Normal.  Cords: Normal.  Branches: Normal.     MUSCLES AND TENDONS:  Muscles and tendons: Nonspecific increased T2 signal along the deep  surface of the infraspinatus muscle on the RIGHT. Intramuscular signal  extends towards the myotendinous junction and visualization of the  distal infraspinatus tendon is suboptimal on this examination. Normal T2  signal in the supraspinatus and subscapularis.     BONES:  Cervical ribs:  None.  C7 transverse  Patient would like to speak with a provider. Thank you   processes: Normal  Marrow signal: Normal.     JOINTS:  Normal cervical spine articulations, sternoclavicular joints,  acromioclavicular joints, and glenohumeral joints.     THORACIC OUTLET / CERVICOTHORACIC JUNCTION:  Normal interscalene triangle, costoclavicular space, and retropectoralis  minor space.     OTHER FINDINGS:  Some scattered cervical lymph nodes bilaterally, most likely reactive.  This is slightly more pronounced on the RIGHT than the LEFT. There are  also mildly prominent axillary lymph nodes bilaterally.          Impression:         1.  Nonspecific edema within the infraspinatus muscle belly on the RIGHT  most pronounced along the deep surface of infraspinatus and the superior  aspect of infraspinatus near the myotendinous junction. This is of  uncertain etiology and could represent a strain, denervation change, or  sequelae of recent injury but is nonspecific. I don't definitively see  evidence of a true plexopathy on this exam. Dedicated MRI of the RIGHT  shoulder without contrast may be beneficial for further characterization  of the infraspinatus muscle belly and distal tendon.      2.  Some mildly prominent cervical and axillary lymph nodes may be  reactive but are nonspecific.     This report was signed and finalized on 4/16/2025 12:45 PM by Dr. Serjio Chirinos MD.       MRI Cervical Spine Without Contrast [[id number]] Collected: 04/16/25 1211     Updated: 04/16/25 1216    Narrative:      EXAMINATION: MRI CERVICAL SPINE WO CONTRAST-     4/16/2025 9:30 AM     HISTORY: right arm weakness; R29.898-Other symptoms and signs involving  the musculoskeletal system     Images are stored in PACS per institutional protocol.     MR imaging of the cervical spine performed without contrast.  Axial and sagittal sequences.  Additional oblique sagittal sequences.     Many of the images are affected by patient motion.     Appropriate lordotic curvature and vertebral body alignment.  Normal prevertebral soft  tissues.  No abnormal cord signal.     C2-C3:  No significant disc abnormality or stenosis.     C3-C4:  No significant disc abnormality or stenosis.     C4-C5:  No significant disc abnormality or stenosis.     C5-C6:  No significant disc abnormality or stenosis.     C6-C7:  No significant disc abnormality or stenosis.     C7-T1:  No focal disc abnormality or stenosis.       Impression:      Impression:  1. No focal disc herniation, cord compression, or significant stenosis.                    This report was signed and finalized on 4/16/2025 12:13 PM by Dr. Steve Acosta MD.       CT Cervical Spine Without Contrast [[id number]] Collected: 04/15/25 2122     Updated: 04/15/25 2129    Narrative:      CT CERVICAL SPINE WO CONTRAST- 4/15/2025 7:35 PM     HISTORY: Right upper extremity weakness and neck pain     COMPARISON: None      DOSE LENGTH PRODUCT: 403.0 mGycm. All CT scans are performed using dose  optimization techniques as appropriate to the performed exam and  including at least one of the following: Automated exposure control,  adjustment of the mA and/or kV according to size, and the use of the  iterative reconstruction technique.     TECHNIQUE: Serial helical tomographic images of the cervical spine were  obtained without the use of intravenous contrast. Additionally, sagittal  and coronal reformatted images were also provided for review.     FINDINGS:  Alignment: Normal.     Bones: There is no evidence of fracture. Vertebral body heights are  maintained.     Disc spaces: Maintained.     Canal and neuroforamina:     C2-C3: No bulging the disc. No central or foraminal stenosis.     C3-C4: No bulging of the disc. No central or foraminal stenosis.     C4-C5: No bulging of the disc. No central or right foraminal stenosis.  Moderate left foraminal narrowing related to uncinate spurring.     C5-C6: No bulging of the disc. No central or right foraminal stenosis.  Mild left foraminal narrowing related to uncinate  spurring.     C6-C7: No bulging of the disc or central stenosis. Uncinate spurring  contributes to mild to moderate bilateral neural foraminal narrowing.     C7-T1: No bulging of the disc. No central or foraminal stenosis.     Soft tissues: Unremarkable.     Lung apices: Clear. No pneumothorax.       Impression:      1. Normal alignment. No occult fracture. Disc space heights well  maintained.  2. No central stenosis. Foraminal narrowing is noted in the mid and  lower cervical spine as described in detail at each disc level above.           This report was signed and finalized on 4/15/2025 9:26 PM by Dr. Fabián Lim MD.       CT Head Without Contrast [[id number]] Collected: 04/15/25 2111     Updated: 04/15/25 2117    Narrative:      CT BRAIN without contrast dated 4/15/2025 7:35 PM     HISTORY: Right upper extremity weakness     COMPARISON: None      DOSE LENGTH PRODUCT: 699.4 mGycm. All CT scans are performed using dose  optimization techniques as appropriate to the performed exam and  including at least one of the following: Automated exposure control,  adjustment of the mA and/or kV according to size, and the use of the  iterative reconstruction technique.     In order to have a CT radiation dose as low as reasonably achievable,  Automated Exposure Control was utilized for adjustment of the mA and/or  KV according to patient size.     TECHNIQUE: Serial axial tomographic images of the brain were obtained  without the use of intravenous contrast.     FINDINGS:  The midline structures are nondisplaced. The ventricles and basilar  cisterns are normal in size and configuration. There is no evidence of  intracranial hemorrhage or mass-effect. The gray-white matter  differentiation is maintained. The sulci have a normal configuration,  and there are no abnormal extra-axial fluid collections. The structures  of the posterior fossa are unremarkable.     The included orbits and their contents are unremarkable. The  visualized  paranasal sinuses, mastoid air cells and middle ear cavities are clear.  The visualized osseous structures and overlying soft tissues of the  skull and face are unremarkable.       Impression:      1. No acute intracranial process.           This report was signed and finalized on 4/15/2025 9:14 PM by Dr. Fabián Lim MD.       US Venous Doppler Upper Extremity Right (duplex) [[id number]] Resulted: 04/15/25 1931     Updated: 04/15/25 2016    XR Outside Films [[id number]] Resulted: 04/15/25 1911     Updated: 04/15/25 1911    Narrative:      This procedure was auto-finalized with no dictation required.    XR Outside Films [[id number]] Resulted: 04/15/25 1911     Updated: 04/15/25 1911    Narrative:      This procedure was auto-finalized with no dictation required.    CT Outside Head [[id number]] Resulted: 04/15/25 1911     Updated: 04/15/25 1911    Narrative:      This procedure was auto-finalized with no dictation required.          LAB RESULTS:      Lab 04/15/25  1907   WBC 6.91   HEMOGLOBIN 14.4   HEMATOCRIT 43.6   PLATELETS 305   NEUTROS ABS 4.51   IMMATURE GRANS (ABS) 0.02   LYMPHS ABS 1.74   MONOS ABS 0.57   EOS ABS 0.02   MCV 93.8   D DIMER QUANT <0.27         Lab 04/15/25  1907   SODIUM 142   POTASSIUM 4.1   CHLORIDE 105   CO2 26.0   ANION GAP 11.0   BUN 10   CREATININE 0.91   EGFR 117.0   GLUCOSE 118*   CALCIUM 9.9         Lab 04/15/25  1907   TOTAL PROTEIN 7.2   ALBUMIN 4.7   GLOBULIN 2.5   ALT (SGPT) 13   AST (SGOT) 13   BILIRUBIN 0.6   ALK PHOS 64                     Brief Urine Lab Results       None          Microbiology Results (last 10 days)       ** No results found for the last 240 hours. **          Microbiology Results (last 10 days)       ** No results found for the last 240 hours. **             Documented weights    04/15/25 1831 04/16/25 0142 04/16/25 0334   Weight: 77.8 kg (171 lb 8 oz) 77.1 kg (170 lb) 77.1 kg (170 lb)        Hospital Course:   Boo Blackburn, 29-year-old male  patient with a reported history of opiate use disorder previously on Suboxone, history of anxiety/depression, presented to the ED for the evaluation of right upper extremity weakness and numbness on 4/15/2025.  As reported, patient had been having the symptoms for the last 3 days and he believed they were progressing.  He was evaluated earlier at [facility]  and he received head CT and x-ray of the shoulder.  He was discharged with instructions to follow-up with PCP and Neurology.  However, patient preferred to be evaluated at a large facility as reported.  Patient complained of the same symptoms as above.  He denied any chest pain, neck trauma, right upper extremity trauma, shortness of breath, nausea vomiting or diarrhea.  He said he was not able to make a fist in his hand, and that it felt numb.   Was admitted to hospitalist service.- ED contacted neurology Dr. Coker, who recommended admission for observation under care of hospitalist and he will consult during the day.  - Admitting patient to regular floor.  - Will provide pain medications and anxiety medications.  - I believe patient may benefit from an MRI of the cervical spine and maybe nerve conduction/EMG of the right upper extremity.  But I would wait for further recommendations from neurology during the day.  - Pending drug screen.    Neurology saw patient on 4/16/2025 with the following assessment and plan:Patient presenting with what I assume to be a right brachial plexopathy.  This likely is a compression neuropathy caused by him consuming a significant amount of Xanax and then likely having altered mentation over the course of the night which would allow for him to lay on the arm for an extensive amount of time overnight.  [place]-H[place] would be a consideration as well but I do not believe that he has significant amounts of pain in that arm to suggest [place]-Herr.  His cervical myelopathy could be considered as well although I think  much less likely.   Impression:  Brachial plexopathy - likely compression plexopathy  Substance abuse  Tobacco abuse     Plan:   MRI of cervical spine with and without  MRI of right brachial plexus  Gabapentin 300 mg 3 times daily for symptomatic relief  Tobacco cessation counseling and substance abuse cessation counseling     Additional Neuro note:   MRI of cervical spine and brachial plexus reviewed.  There is some evidence of some muscle injury near the right brachial plexus.  In my opinion this may strengthen the argument of a compression plexopathy.  I see no evidence of a demyelinating event in the cervical spine.  There is no other neurologic exam findings to suggest a brain lesion.  I think is reasonable at this time to tentatively call this compression plexopathy.  I did give him 300 mg of Neurontin he tells me that is making him very sleepy but gave him complete resolution of the paresthesias.  He also tells me that some of the strength is coming back today.  I think it is reasonable for him to be discharged home.  I recommend him utilizing a sling for his right arm intermittently over the next week.  I have lowered the dose of Neurontin and he prescribed Neurontin 100 mg 3 times daily.  I gave him 1 month with 1 refill as I anticipate that this injury will improve over 4 to 6 weeks.  I have asked that he return to the outpatient neurology clinic in approximately 8 weeks for follow-up.  He is to call in the interim if there is any other worsening or changes in his symptoms.  He expresses understanding.   Electronically signed by Serjio Coker MD, 04/16/25, 3:27 PM CDT.    4/16/2025 Patient was standing up in room.  He was alert and oriented x 3 and was in no acute distress. Complained of pain in right shoulder and neck.  Right hand. swelling noted.  Patient reported he slept on his arm 3 nights ago, and it had been hurting ever since.  He reported it was too painful to lift up his arm.  Patient  "requested pain medication.  He reported Mobic does not help his pain.  After neurology recommendations patient is agreeable and stable for discharge home.     Patient has reached the maximum benefit of hospitalization and is stable for discharge  Patient has been evaluated today 04/16/25 and is stable for discharge.   Physical Exam on Discharge:  /73 (BP Location: Right arm, Patient Position: Lying)   Pulse 78   Temp 97.7 °F (36.5 °C) (Oral)   Resp 18   Ht 177.8 cm (70\")   Wt 77.1 kg (170 lb)   SpO2 100%   BMI 24.39 kg/m²   Physical Exam  Constitutional:       Appearance: Normal appearance.   HENT:      Head: Normocephalic and atraumatic.      Nose: Nose normal.      Mouth/Throat:      Mouth: Mucous membranes are moist.      Pharynx: Oropharynx is clear.   Eyes:      Extraocular Movements: Extraocular movements intact.      Conjunctiva/sclera: Conjunctivae normal.   Cardiovascular:      Rate and Rhythm: Normal rate and regular rhythm.      Pulses: Normal pulses.      Heart sounds: Normal heart sounds.   Pulmonary:      Effort: Pulmonary effort is normal.      Breath sounds: Normal breath sounds.   Abdominal:      General: Bowel sounds are normal.      Palpations: Abdomen is soft.   Musculoskeletal:         General: Tenderness present.      Cervical back: Rigidity present.      Comments: Right hand swelling.   Skin:     General: Skin is warm and dry.      Capillary Refill: Capillary refill takes 2 to 3 seconds.   Neurological:      General: No focal deficit present.      Mental Status: He is alert and oriented to person, place, and time.   Psychiatric:         Mood and Affect: Mood normal.         Behavior: Behavior normal.     Condition on Discharge:  stable    Discharge Disposition: home      Discharge Medications:     Discharge Medications        New Medications        Instructions Start Date   gabapentin 100 MG capsule  Commonly known as: Neurontin   100 mg, Oral, 3 Times Daily             ASK your " doctor about these medications        Instructions Start Date   buPROPion  MG 24 hr tablet  Commonly known as: Wellbutrin XL   300 mg, Oral, Daily      methocarbamol 500 MG tablet  Commonly known as: ROBAXIN   500 mg, Oral, 4 Times Daily      Testosterone Cypionate 200 MG/ML injection  Commonly known as: DEPOTESTOTERONE CYPIONATE   100 mg, Intramuscular, Every 14 Days                 Discharge Diet:  Regular    Activity at Discharge:  May resume normal activities    Discharge Instructions:   Return to ER for new or worsening symptoms.  Follow-up with PCP in 1 week.  Follow-up with Neurology in 8 weeks.  Take Neurontin 100 mg 3 times a day as ordered by Dr. Coker, Neurology.  Wear a sling on right arm for 4-6 weeks.  Continue home medications.      Follow-up Appointments:   Future Appointments   Date Time Provider Department Center   6/13/2025  9:00 AM Geneva Mayfield, JEAN PIERRE, APRN [facility]       Test Results Pending at Discharge: none    Electronically signed by LITA Chaudhari, 04/16/25, 17:36 CDT.    Time: 45 minutes.

## 2025-04-16 NOTE — PROGRESS NOTES
Neuro note:    MRI of cervical spine and brachial plexus reviewed.  There is some evidence of some muscle injury near the right brachial plexus.  In my opinion this may strengthen the argument of a compression plexopathy.  I see no evidence of a demyelinating event in the cervical spine.  There is no other neurologic exam findings to suggest a brain lesion.  I think is reasonable at this time to tentatively call this compression plexopathy.  I did give him 300 mg of Neurontin he tells me that is making him very sleepy but gave him complete resolution of the paresthesias.  He also tells me that some of the strength is coming back today.  I think it is reasonable for him to be discharged home.  I recommend him utilizing a sling for his right arm intermittently over the next week.  I have lowered the dose of Neurontin and he prescribed Neurontin 100 mg 3 times daily.  I gave him 1 month with 1 refill as I anticipate that this injury will improve over 4 to 6 weeks.  I have asked that he return to the outpatient neurology clinic in approximately 8 weeks for follow-up.  He is to call in the interim if there is any other worsening or changes in his symptoms.  He expresses understanding.    Electronically signed by Serjio Coker MD, 04/16/25, 3:27 PM CDT.

## 2025-04-16 NOTE — PLAN OF CARE
Goal Outcome Evaluation:  Plan of Care Reviewed With: patient, significant other        Progress: no change  Outcome Evaluation: Pt admitted to room 354 from ED for Rt arm weakness. Pt oriented to room. A&OX4. VSS on RA. Skin intact. No IV. Up adlib. Voiding W/O difficulty. Family at bedside. Call light within reach, safety maintained.

## 2025-04-16 NOTE — PROGRESS NOTES
[facility] Medicine Services  INPATIENT PROGRESS NOTE    Patient Name: Boo Blackburn  Date of Admission: 4/15/2025  Today's Date: 04/16/25  Length of Stay: 0  Primary Care Physician: Geneva Mayfield, DNP, APRN    Subjective   Chief Complaint: Right upper extremity numbness and weakness     Arm Pain     Neck Pain          Today: Patient standing up in room.  He is alert and oriented x 3 and is in no acute distress. Complains of pain in right shoulder and neck.  Right hand. swelling noted.  Patient reports he slept on his arm 3 nights ago and it has been hurting ever since.  He reports it is too painful to lift up his arm.  Patient requested pain medication.  He reported Mobic does not help his pain.      Review of Systems   Musculoskeletal:  Positive for neck pain.      All pertinent negatives and positives are as above. All other systems have been reviewed and are negative unless otherwise stated.     Objective    Temp:  [97.6 °F (36.4 °C)-97.9 °F (36.6 °C)] 97.6 °F (36.4 °C)  Heart Rate:  [] 87  Resp:  [16-18] 16  BP: (143-150)/(88-95) 143/90  Physical Exam  Constitutional:       Appearance: Normal appearance.   HENT:      Head: Normocephalic and atraumatic.      Nose: Nose normal.      Mouth/Throat:      Mouth: Mucous membranes are moist.      Pharynx: Oropharynx is clear.   Eyes:      Extraocular Movements: Extraocular movements intact.      Conjunctiva/sclera: Conjunctivae normal.   Cardiovascular:      Rate and Rhythm: Normal rate and regular rhythm.      Pulses: Normal pulses.      Heart sounds: Normal heart sounds.   Pulmonary:      Effort: Pulmonary effort is normal.      Breath sounds: Normal breath sounds.   Abdominal:      General: Bowel sounds are normal.      Palpations: Abdomen is soft.   Musculoskeletal:         General: Tenderness present.      Cervical back: Rigidity present.      Comments: Right hand swelling.   Skin:     General: Skin is warm and dry.  "     Capillary Refill: Capillary refill takes 2 to 3 seconds.   Neurological:      General: No focal deficit present.      Mental Status: He is alert and oriented to person, place, and time.   Psychiatric:         Mood and Affect: Mood normal.         Behavior: Behavior normal.       Results Review:  I have reviewed the labs, radiology results, and diagnostic studies.    Laboratory Data:   Results from last 7 days   Lab Units 04/15/25  1907   WBC 10*3/mm3 6.91   HEMOGLOBIN g/dL 14.4   HEMATOCRIT % 43.6   PLATELETS 10*3/mm3 305        Results from last 7 days   Lab Units 04/15/25  1907   SODIUM mmol/L 142   POTASSIUM mmol/L 4.1   CHLORIDE mmol/L 105   CO2 mmol/L 26.0   BUN mg/dL 10   CREATININE mg/dL 0.91   CALCIUM mg/dL 9.9   BILIRUBIN mg/dL 0.6   ALK PHOS U/L 64   ALT (SGPT) U/L 13   AST (SGOT) U/L 13   GLUCOSE mg/dL 118*       Culture Data:   No results found for: \"BLOODCX\", \"URINECX\", \"WOUNDCX\", \"MRSACX\", \"RESPCX\", \"STOOLCX\"    Radiology Data:   Imaging Results (Last 24 Hours)       Procedure Component Value Units Date/Time    MRI brachial plexus upper extremity right w wo contrast [[id number]] Collected: 04/16/25 1233     Updated: 04/16/25 1248    Narrative:      EXAMINATION: MRI BRACHIAL PLEXUS UPPER EXTREMITY RIGHT W WO CONTRAST-  4/16/2025 9:55 AM     HISTORY: right arm weakness and numbness; R29.898-Other symptoms and  signs involving the musculoskeletal system. Possible compression  brachial plexopathy on the RIGHT.     COMPARISON: MRI cervical spine 4/16/2025 and outside CT dated 4/15/2025     TECHNIQUE: Multiplanar, multiphasic MR images of the brachial plexus  were obtained before and after the intravenous infusion of 8 mL  intravenous Vueway contrast.     SPINAL CORD:  Normal caliber and signal of visualized spinal cord.     BRACHIAL PLEXUS:  Roots: Normal.  Trunks: Normal.  Divisions: Normal.  Cords: Normal.  Branches: Normal.     MUSCLES AND TENDONS:  Muscles and tendons: Nonspecific increased T2 " signal along the deep  surface of the infraspinatus muscle on the RIGHT. Intramuscular signal  extends towards the myotendinous junction and visualization of the  distal infraspinatus tendon is suboptimal on this examination. Normal T2  signal in the supraspinatus and subscapularis.     BONES:  Cervical ribs:  None.  C7 transverse processes: Normal  Marrow signal: Normal.     JOINTS:  Normal cervical spine articulations, sternoclavicular joints,  acromioclavicular joints, and glenohumeral joints.     THORACIC OUTLET / CERVICOTHORACIC JUNCTION:  Normal interscalene triangle, costoclavicular space, and retropectoralis  minor space.     OTHER FINDINGS:  Some scattered cervical lymph nodes bilaterally, most likely reactive.  This is slightly more pronounced on the RIGHT than the LEFT. There are  also mildly prominent axillary lymph nodes bilaterally.          Impression:         1.  Nonspecific edema within the infraspinatus muscle belly on the RIGHT  most pronounced along the deep surface of infraspinatus and the superior  aspect of infraspinatus near the myotendinous junction. This is of  uncertain etiology and could represent a strain, denervation change, or  sequelae of recent injury but is nonspecific. I don't definitively see  evidence of a true plexopathy on this exam. Dedicated MRI of the RIGHT  shoulder without contrast may be beneficial for further characterization  of the infraspinatus muscle belly and distal tendon.      2.  Some mildly prominent cervical and axillary lymph nodes may be  reactive but are nonspecific.     This report was signed and finalized on 4/16/2025 12:45 PM by Dr. Serjio Chirinos MD.       MRI Cervical Spine Without Contrast [[id number]] Collected: 04/16/25 1211     Updated: 04/16/25 1216    Narrative:      EXAMINATION: MRI CERVICAL SPINE WO CONTRAST-     4/16/2025 9:30 AM     HISTORY: right arm weakness; R29.898-Other symptoms and signs involving  the musculoskeletal system     Images  are stored in PACS per institutional protocol.     MR imaging of the cervical spine performed without contrast.  Axial and sagittal sequences.  Additional oblique sagittal sequences.     Many of the images are affected by patient motion.     Appropriate lordotic curvature and vertebral body alignment.  Normal prevertebral soft tissues.  No abnormal cord signal.     C2-C3:  No significant disc abnormality or stenosis.     C3-C4:  No significant disc abnormality or stenosis.     C4-C5:  No significant disc abnormality or stenosis.     C5-C6:  No significant disc abnormality or stenosis.     C6-C7:  No significant disc abnormality or stenosis.     C7-T1:  No focal disc abnormality or stenosis.       Impression:      Impression:  1. No focal disc herniation, cord compression, or significant stenosis.                    This report was signed and finalized on 4/16/2025 12:13 PM by Dr. Setve Acosta MD.       CT Cervical Spine Without Contrast [[id number]] Collected: 04/15/25 2122     Updated: 04/15/25 2129    Narrative:      CT CERVICAL SPINE WO CONTRAST- 4/15/2025 7:35 PM     HISTORY: Right upper extremity weakness and neck pain     COMPARISON: None      DOSE LENGTH PRODUCT: 403.0 mGycm. All CT scans are performed using dose  optimization techniques as appropriate to the performed exam and  including at least one of the following: Automated exposure control,  adjustment of the mA and/or kV according to size, and the use of the  iterative reconstruction technique.     TECHNIQUE: Serial helical tomographic images of the cervical spine were  obtained without the use of intravenous contrast. Additionally, sagittal  and coronal reformatted images were also provided for review.     FINDINGS:  Alignment: Normal.     Bones: There is no evidence of fracture. Vertebral body heights are  maintained.     Disc spaces: Maintained.     Canal and neuroforamina:     C2-C3: No bulging the disc. No central or foraminal stenosis.      C3-C4: No bulging of the disc. No central or foraminal stenosis.     C4-C5: No bulging of the disc. No central or right foraminal stenosis.  Moderate left foraminal narrowing related to uncinate spurring.     C5-C6: No bulging of the disc. No central or right foraminal stenosis.  Mild left foraminal narrowing related to uncinate spurring.     C6-C7: No bulging of the disc or central stenosis. Uncinate spurring  contributes to mild to moderate bilateral neural foraminal narrowing.     C7-T1: No bulging of the disc. No central or foraminal stenosis.     Soft tissues: Unremarkable.     Lung apices: Clear. No pneumothorax.       Impression:      1. Normal alignment. No occult fracture. Disc space heights well  maintained.  2. No central stenosis. Foraminal narrowing is noted in the mid and  lower cervical spine as described in detail at each disc level above.           This report was signed and finalized on 4/15/2025 9:26 PM by Dr. Fabián Lim MD.       CT Head Without Contrast [[id number]] Collected: 04/15/25 2111     Updated: 04/15/25 2117    Narrative:      CT BRAIN without contrast dated 4/15/2025 7:35 PM     HISTORY: Right upper extremity weakness     COMPARISON: None      DOSE LENGTH PRODUCT: 699.4 mGycm. All CT scans are performed using dose  optimization techniques as appropriate to the performed exam and  including at least one of the following: Automated exposure control,  adjustment of the mA and/or kV according to size, and the use of the  iterative reconstruction technique.     In order to have a CT radiation dose as low as reasonably achievable,  Automated Exposure Control was utilized for adjustment of the mA and/or  KV according to patient size.     TECHNIQUE: Serial axial tomographic images of the brain were obtained  without the use of intravenous contrast.     FINDINGS:  The midline structures are nondisplaced. The ventricles and basilar  cisterns are normal in size and configuration. There is  no evidence of  intracranial hemorrhage or mass-effect. The gray-white matter  differentiation is maintained. The sulci have a normal configuration,  and there are no abnormal extra-axial fluid collections. The structures  of the posterior fossa are unremarkable.     The included orbits and their contents are unremarkable. The visualized  paranasal sinuses, mastoid air cells and middle ear cavities are clear.  The visualized osseous structures and overlying soft tissues of the  skull and face are unremarkable.       Impression:      1. No acute intracranial process.           This report was signed and finalized on 4/15/2025 9:14 PM by Dr. Fabián Lim MD.       US Venous Doppler Upper Extremity Right (duplex) [[id number]] Resulted: 04/15/25 1931     Updated: 04/15/25 2016    XR Outside Films [[id number]] Resulted: 04/15/25 1911     Updated: 04/15/25 1911    Narrative:      This procedure was auto-finalized with no dictation required.    XR Outside Films [[id number]] Resulted: 04/15/25 1911     Updated: 04/15/25 1911    Narrative:      This procedure was auto-finalized with no dictation required.    CT Outside Head [[id number]] Resulted: 04/15/25 1911     Updated: 04/15/25 1911    Narrative:      This procedure was auto-finalized with no dictation required.            I have reviewed the patient's current medications.     Assessment/Plan   Assessment  Active Hospital Problems    Diagnosis     **Arm weakness     Numbness and tingling of right arm     Nicotine dependence, cigarettes, uncomplicated        Treatment Plan  1.  Arm weakness-Monitor for changes.  Consider Nerve Conduction/EMG.  Await recommendations from Neurology.    2.  Numbness/tingling right arm-Neurology following and started on Gabapentin 300 mg 1 p.o. every 8 hours.  Neurology ordered MRI of cervical spine] complex.    3.  Nicotine dependence-Continue Nicotine patches as per protocol. Continue education about smoking cessation.      Medical Decision  Making  Arm weakness-acute, moderate complexity, unchanged  Numbness/tingling right arm-acute, moderate complexity, unchanged  Nicotine dependence-chronic, moderate complexity, unchanged    Number and Complexity of problems: 3  Differential Diagnosis: none    Conditions and Status        Condition is unchanged.     [facility] Data  External documents reviewed:  Epic records  Cardiac tracing (EKG, telemetry) interpretation: none  Radiology interpretation: Ultrasound venous per radiology 4/15/2025  Labs reviewed: CMP, CBC, urine drug screen.  Any tests that were considered but not ordered: None     Decision rules/scores evaluated (example [id number], Wells, etc): none     Discussed with: Patient and Dr. Almonte     Care Planning  Shared decision making: Patient and Dr. Almonte  Code status and discussions:-CPR-Full support    Disposition  Social Determinants of Health that impact treatment or disposition: None  I expect the patient to be discharged to home in 1-2 days.         Electronically signed by EMELIA Chaudhari, 04/16/25, 15:01 CDT.

## 2025-04-16 NOTE — H&P
[facility] Services  HISTORY AND PHYSICAL    Date of Admission: 4/15/2025  Primary Care Physician: Geneva Mayfield, DNP, APRN    Subjective   Primary Historian: Patient    Chief Complaint: Right upper extremity numbness and weakness    History of Present Illness  This is a 29-year-old male patient with a reported history of opiate use disorder previously on Suboxone, history of anxiety/depression, presenting to the ED for the evaluation of right upper extremity weakness and numbness.  As reported, patient has been having the symptoms for the last 3 days and he believes they are progressing.  He was evaluated earlier at [facility] and he received head CT and x-ray of the shoulder.  He was discharged with instructions to follow-up with PCP and neurology.  However patient preferred to be evaluated at a large facility as reported.  Patient complaining of the same symptoms as above.  He denies any chest pain, neck trauma, right upper extremity trauma, shortness of breath, nausea vomiting or diarrhea.  He says he is not able to make a fist in his hand, and that it feels numb.        Review of Systems   Otherwise complete ROS reviewed and negative except as mentioned in the HPI.    Past Medical History:   Past Medical History:   Diagnosis Date    Afib     Heart attack     mild    Myocardial bridge      Past Surgical History:History reviewed. No pertinent surgical history.  Social History:  reports that he has been smoking cigarettes. He has a 6 pack-year smoking history. His smokeless tobacco use includes snuff. He reports current alcohol use. He reports that he does not currently use drugs.    Family History: family history includes Hyperlipidemia in his mother; Hypertension in his mother; No Known Problems in his father.       Allergies:  No Known Allergies    Medications:  Prior to Admission medications    Medication Sig Start Date End Date Taking? Authorizing  "Provider   anastrozole (ARIMIDEX) 1 MG tablet Take  by mouth Daily.    Naif Fish MD   azithromycin (Zithromax Z-Srini) 250 MG tablet Take 2 tablets by mouth on day 1, then 1 tablet daily on days 2-5 1/18/24   Mary Ann Tineo APRN   buprenorphine-naloxone (SUBOXONE) 8-2 MG per SL tablet Place 1 tablet under the tongue Daily. Patient states he has not been taking this medication lately 3/21/19   Naif Fish MD   buPROPion XL (Wellbutrin XL) 300 MG 24 hr tablet Take 1 tablet by mouth Daily. 12/13/24   Brian Ba MD   busPIRone (BUSPAR) 10 MG tablet Take 1 tablet by mouth 3 (Three) Times a Day. 12/13/24   Brian Ba MD   meloxicam (Mobic) 7.5 MG tablet Take 1 tablet by mouth Daily. 12/13/24   Brian Ba MD   methocarbamol (ROBAXIN) 500 MG tablet Take 1 tablet by mouth 4 (Four) Times a Day. 12/13/24   Brian Ba MD   Testosterone Cypionate (DEPOTESTOTERONE CYPIONATE) 200 MG/ML injection Inject 1 mL into the appropriate muscle as directed by prescriber Every 14 (Fourteen) Days. 6/15/23   Naif Fish MD   tiZANidine (ZANAFLEX) 2 MG tablet Take 1-2 tablets by mouth Every 8 (Eight) Hours As Needed for Muscle Spasms. 1/8/24   Mary Ann Tineo APRN     I have utilized all available immediate resources to obtain, update, or review the patient's current medications (including all prescriptions, over-the-counter products, herbals, cannabis/cannabidiol products, and vitamin/mineral/dietary (nutritional) supplements).    Objective     Vital Signs: /95 (BP Location: Left arm, Patient Position: Lying)   Pulse 86   Temp 97.9 °F (36.6 °C) (Oral)   Resp 18   Ht 175.3 cm (69\")   Wt 77.8 kg (171 lb 8 oz)   SpO2 99%   BMI 25.33 kg/m²   Physical Exam  Constitutional:       Appearance: Normal appearance.   Cardiovascular:      Rate and Rhythm: Normal rate and regular rhythm.      Pulses: Normal pulses.      Heart sounds: Normal heart sounds. No " murmur heard.  Pulmonary:      Effort: Pulmonary effort is normal. No respiratory distress.      Breath sounds: Normal breath sounds. No wheezing or rales.   Abdominal:      General: Bowel sounds are normal. There is no distension.      Palpations: Abdomen is soft.      Tenderness: There is no abdominal tenderness. There is no guarding.   Musculoskeletal:      Right lower leg: No edema.      Left lower leg: No edema.   Skin:     General: Skin is warm.   Neurological:      Mental Status: He is alert and oriented to person, place, and time.      Cranial Nerves: No cranial nerve deficit.      Sensory: No sensory deficit.      Motor: Weakness (Muscle strength mildly diminished on the right upper extremity) present.              Results Reviewed:  Lab Results (last 24 hours)       Procedure Component Value Units Date/Time    Fentanyl, Urine - Urine, Clean Catch [[id number]]  (Normal) Collected: 04/16/25 0219    Specimen: Urine, Clean Catch Updated: 04/16/25 0343     Fentanyl, Urine Negative    Narrative:      Negative Threshold:      Fentanyl 5 ng/mL     The normal value for the drug tested is negative. This report includes final unconfirmed screening results to be used for medical treatment purposes only. Unconfirmed results must not be used for non-medical purposes such as employment or legal testing. Clinical consideration should be applied to any drug of abuse test, particularly when unconfirmed results are used.           Urine Drug Screen - Urine, Clean Catch [[id number]]  (Normal) Collected: 04/16/25 0219    Specimen: Urine, Clean Catch Updated: 04/16/25 0343     THC, Screen, Urine Negative     Phencyclidine (PCP), Urine Negative     Cocaine Screen, Urine Negative     Methamphetamine, Ur Negative     Opiate Screen Negative     Amphetamine Screen, Urine Negative     Benzodiazepine Screen, Urine Negative     Tricyclic Antidepressants Screen Negative     Methadone Screen, Urine Negative     Barbiturates Screen, Urine  Negative     Oxycodone Screen, Urine Negative     Buprenorphine, Screen, Urine Negative    Narrative:      Cutoff For Drugs Screened:    Amphetamines               500 ng/ml  Barbiturates               200 ng/ml  Benzodiazepines            150 ng/ml  Cocaine                    150 ng/ml  Methadone                  200 ng/ml  Opiates                    100 ng/ml  Phencyclidine               25 ng/ml  THC                         50 ng/ml  Methamphetamine            500 ng/ml  Tricyclic Antidepressants  300 ng/ml  Oxycodone                  100 ng/ml  Buprenorphine               10 ng/ml    The normal value for all drugs tested is negative. This report includes unconfirmed screening results, with the cutoff values listed, to be used for medical treatment purposes only.  Unconfirmed results must not be used for non-medical purposes such as employment or legal testing.  Clinical consideration should be applied to any drug of abuse test, particularly when unconfirmed results are used.      Comprehensive Metabolic Panel [[id number]]  (Abnormal) Collected: 04/15/25 1907    Specimen: Blood from Arm, Right Updated: 04/15/25 1931     Glucose 118 mg/dL      BUN 10 mg/dL      Creatinine 0.91 mg/dL      Sodium 142 mmol/L      Potassium 4.1 mmol/L      Chloride 105 mmol/L      CO2 26.0 mmol/L      Calcium 9.9 mg/dL      Total Protein 7.2 g/dL      Albumin 4.7 g/dL      ALT (SGPT) 13 U/L      AST (SGOT) 13 U/L      Alkaline Phosphatase 64 U/L      Total Bilirubin 0.6 mg/dL      Globulin 2.5 gm/dL      A/G Ratio 1.9 g/dL      BUN/Creatinine Ratio 11.0     Anion Gap 11.0 mmol/L      eGFR 117.0 mL/min/1.73     Narrative:      GFR Categories in Chronic Kidney Disease (CKD)      GFR Category          GFR (mL/min/1.73)    Interpretation  G1                     90 or greater         Normal or high (1)  G2                      60-89                Mild decrease (1)  G3a                   45-59                Mild to moderate decrease  G3b     "               30-44                Moderate to severe decrease  G4                    15-29                Severe decrease  G5                    14 or less           Kidney failure          (1)In the absence of evidence of kidney disease, neither GFR category G1 or G2 fulfill the criteria for CKD.    eGFR calculation 2021 CKD-EPI creatinine equation, which does not include race as a factor    D-dimer, Quantitative [[id number]]  (Normal) Collected: 04/15/25 1907    Specimen: Blood from Arm, Right Updated: 04/15/25 1925     D-Dimer, Quantitative <0.27 MCGFEU/mL     Narrative:      According to the assay 's published package insert, a normal (<0.50 MCGFEU/mL) D-dimer result in conjunction with a non-high clinical probability assessment, excludes deep vein thrombosis (DVT) and pulmonary embolism (PE) with high sensitivity.    D-dimer values increase with age and this can make VTE exclusion of an older population difficult. To address this, the [employer], based on best available evidence and recent guidelines, recommends that clinicians use age-adjusted D-dimer thresholds in patients greater than 50 years of age with: a) a low probability of PE who do not meet all Pulmonary Embolism Rule Out Criteria, or b) in those with intermediate probability of PE.   The formula for an age-adjusted D-dimer cut-off is \"age/100\".  For example, a 60 year old patient would have an age-adjusted cut-off of 0.60 MCGFEU/mL and an 80 year old 0.80 MCGFEU/mL.    CBC & Differential [[id number]]  (Abnormal) Collected: 04/15/25 1907    Specimen: Blood from Arm, Right Updated: 04/15/25 1914    Narrative:      The following orders were created for panel order CBC & Differential.  Procedure                               Abnormality         Status                     ---------                               -----------         ------                     CBC Auto Differential[[id number]]         Abnormal            Final " result                 Please view results for these tests on the individual orders.    CBC Auto Differential [[id number]]  (Abnormal) Collected: 04/15/25 1907    Specimen: Blood from Arm, Right Updated: 04/15/25 1914     WBC 6.91 10*3/mm3      RBC 4.65 10*6/mm3      Hemoglobin 14.4 g/dL      Hematocrit 43.6 %      MCV 93.8 fL      MCH 31.0 pg      MCHC 33.0 g/dL      RDW 12.0 %      RDW-SD 42.3 fl      MPV 9.3 fL      Platelets 305 10*3/mm3      Neutrophil % 65.3 %      Lymphocyte % 25.2 %      Monocyte % 8.2 %      Eosinophil % 0.3 %      Basophil % 0.7 %      Immature Grans % 0.3 %      Neutrophils, Absolute 4.51 10*3/mm3      Lymphocytes, Absolute 1.74 10*3/mm3      Monocytes, Absolute 0.57 10*3/mm3      Eosinophils, Absolute 0.02 10*3/mm3      Basophils, Absolute 0.05 10*3/mm3      Immature Grans, Absolute 0.02 10*3/mm3      nRBC 0.0 /100 WBC           Imaging Results (Last 24 Hours)       Procedure Component Value Units Date/Time    CT Cervical Spine Without Contrast [[id number]] Collected: 04/15/25 2122     Updated: 04/15/25 2129    Narrative:      CT CERVICAL SPINE WO CONTRAST- 4/15/2025 7:35 PM     HISTORY: Right upper extremity weakness and neck pain     COMPARISON: None      DOSE LENGTH PRODUCT: 403.0 mGycm. All CT scans are performed using dose  optimization techniques as appropriate to the performed exam and  including at least one of the following: Automated exposure control,  adjustment of the mA and/or kV according to size, and the use of the  iterative reconstruction technique.     TECHNIQUE: Serial helical tomographic images of the cervical spine were  obtained without the use of intravenous contrast. Additionally, sagittal  and coronal reformatted images were also provided for review.     FINDINGS:  Alignment: Normal.     Bones: There is no evidence of fracture. Vertebral body heights are  maintained.     Disc spaces: Maintained.     Canal and neuroforamina:     C2-C3: No bulging the disc. No central  or foraminal stenosis.     C3-C4: No bulging of the disc. No central or foraminal stenosis.     C4-C5: No bulging of the disc. No central or right foraminal stenosis.  Moderate left foraminal narrowing related to uncinate spurring.     C5-C6: No bulging of the disc. No central or right foraminal stenosis.  Mild left foraminal narrowing related to uncinate spurring.     C6-C7: No bulging of the disc or central stenosis. Uncinate spurring  contributes to mild to moderate bilateral neural foraminal narrowing.     C7-T1: No bulging of the disc. No central or foraminal stenosis.     Soft tissues: Unremarkable.     Lung apices: Clear. No pneumothorax.       Impression:      1. Normal alignment. No occult fracture. Disc space heights well  maintained.  2. No central stenosis. Foraminal narrowing is noted in the mid and  lower cervical spine as described in detail at each disc level above.           This report was signed and finalized on 4/15/2025 9:26 PM by Dr. Fabián Lim MD.       CT Head Without Contrast [[id number]] Collected: 04/15/25 2111     Updated: 04/15/25 2117    Narrative:      CT BRAIN without contrast dated 4/15/2025 7:35 PM     HISTORY: Right upper extremity weakness     COMPARISON: None      DOSE LENGTH PRODUCT: 699.4 mGycm. All CT scans are performed using dose  optimization techniques as appropriate to the performed exam and  including at least one of the following: Automated exposure control,  adjustment of the mA and/or kV according to size, and the use of the  iterative reconstruction technique.     In order to have a CT radiation dose as low as reasonably achievable,  Automated Exposure Control was utilized for adjustment of the mA and/or  KV according to patient size.     TECHNIQUE: Serial axial tomographic images of the brain were obtained  without the use of intravenous contrast.     FINDINGS:  The midline structures are nondisplaced. The ventricles and basilar  cisterns are normal in size  and configuration. There is no evidence of  intracranial hemorrhage or mass-effect. The gray-white matter  differentiation is maintained. The sulci have a normal configuration,  and there are no abnormal extra-axial fluid collections. The structures  of the posterior fossa are unremarkable.     The included orbits and their contents are unremarkable. The visualized  paranasal sinuses, mastoid air cells and middle ear cavities are clear.  The visualized osseous structures and overlying soft tissues of the  skull and face are unremarkable.       Impression:      1. No acute intracranial process.           This report was signed and finalized on 4/15/2025 9:14 PM by Dr. Fabián Lim MD.       US Venous Doppler Upper Extremity Right (duplex) [[id number]] Resulted: 04/15/25 1931     Updated: 04/15/25 2016    XR Outside Films [[id number]] Resulted: 04/15/25 1911     Updated: 04/15/25 1911    Narrative:      This procedure was auto-finalized with no dictation required.    XR Outside Films [[id number]] Resulted: 04/15/25 1911     Updated: 04/15/25 1911    Narrative:      This procedure was auto-finalized with no dictation required.    CT Outside Head [[id number]] Resulted: 04/15/25 1911     Updated: 04/15/25 1911    Narrative:      This procedure was auto-finalized with no dictation required.          I have personally reviewed and interpreted the radiology studies and ECG obtained at time of admission.     Assessment / Plan   Assessment:   Active Hospital Problems    Diagnosis     **Arm weakness        Treatment Plan  The patient will be admitted to my service here at [facility].     Assessment and plan:  #Right upper extremity weakness and numbness.  Rule out nerve impingement.  #Cervical foraminal narrowing.  #Anxiety.    - ED contacted neurology Dr. Coker, who recommended admission for observation under care of hospitalist and he will consult during the day.  - Admitting patient to regular floor.  - Will  provide pain medications and anxiety medications.  - I believe patient may benefit from an MRI of the cervical spine and maybe nerve conduction/EMG of the right upper extremity.  But I would wait for further recommendations from neurology during the day.  - Pending drug screen.    Medical Decision Making  Number and Complexity of problems: 2 acute and moderate  Differential Diagnosis: As above    Conditions and Status        Condition is worsening.     MDM Data  External documents reviewed: Yes  Cardiac tracing (EKG, telemetry) interpretation: None  Radiology interpretation: Yes  Labs reviewed: Yes  Any tests that were considered but not ordered: No     Decision rules/scores evaluated (example [id number], Wells, etc): No     Discussed with: Patient and ED provider     Care Planning  Shared decision making: Discussed the plan with the patient and he is agreeable  Code status and discussions: Full code    Disposition  Social Determinants of Health that impact treatment or disposition: Not at the moment  Estimated length of stay is 1 to 2 days.     I confirmed that the patient's advanced care plan is present, code status is documented, and a surrogate decision maker is listed in the patient's medical record.       The patient was seen and examined by me on 4/15 and 10:30 PM    Electronically signed by Eliza Rice MD, 04/16/25, 03:47 CDT.

## 2025-04-16 NOTE — CONSULTS
Neurology Consult Note    Consult Date: 2025  Referring MD: No ref. provider found  Reason for Consult: RUE weakness    Patient: Boo Blackburn (29 y.o. male)  MRN: [id number]  : 1995    History of Present Illness:   Boo Blackburn is a 29 y.o. male with a reasonably comprehensive history of polysubstance abuse including cocaine, methamphetamines, Xanax, Lyrica, and opiates.  He tells me that approximately 4 nights ago he took a large quantity of Xanax and then went to sleep.  He woke up the following morning and could not move his right upper extremity.  He reports severe paresthesias in the right arm as well.  He denies neck pain.  He denies any bulbar features.  He denies any bowel or bladder incontinence.  He denies any diplopia.  He denies any dysphagia.  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  He also has a brief history of A-fib surrounding the heart attacks but is not on any therapy for this.  He does admit to Lhermitte sign but this has not occurred recently.  He denies bowel and bladder incontinence.      Medical History:   Past Medical/Surgical Hx:  Past Medical History:   Diagnosis Date    Afib     Heart attack     mild    Myocardial bridge      History reviewed. No pertinent surgical history.    Medications On Admission:  Facility-Administered Medications Prior to Admission   Medication Dose Route Frequency Provider Last Rate Last Admin    Lidocaine Viscous HCl (XYLOCAINE) 2 % solution 5 mL  5 mL Mouth/Throat Q3H PRN Geneva Mayfield, DNP, APRN         Medications Prior to Admission   Medication Sig Dispense Refill Last Dose/Taking    buPROPion XL (Wellbutrin XL) 300 MG 24 hr tablet Take 1 tablet by mouth Daily. 30 tablet 1 2025 Morning    meloxicam (Mobic) 7.5 MG tablet Take 1 tablet by mouth Daily. 30 tablet 1 Patient Taking Differently    methocarbamol (ROBAXIN) 500 MG tablet Take 1 tablet by mouth 4 (Four) Times a Day. (Patient taking  differently: Take 1 tablet by mouth 4 (Four) Times a Day. Pt PRN) 120 tablet 1 Patient Taking Differently    Testosterone Cypionate (DEPOTESTOTERONE CYPIONATE) 200 MG/ML injection Inject 1 mL into the appropriate muscle as directed by prescriber Every 14 (Fourteen) Days.   Past Week    anastrozole (ARIMIDEX) 1 MG tablet Take  by mouth Daily.       azithromycin (Zithromax Z-Srini) 250 MG tablet Take 2 tablets by mouth on day 1, then 1 tablet daily on days 2-5 6 tablet 0     buprenorphine-naloxone (SUBOXONE) 8-2 MG per SL tablet Place 1 tablet under the tongue Daily. Patient states he has not been taking this medication lately  0     busPIRone (BUSPAR) 10 MG tablet Take 1 tablet by mouth 3 (Three) Times a Day. 90 tablet 2     tiZANidine (ZANAFLEX) 2 MG tablet Take 1-2 tablets by mouth Every 8 (Eight) Hours As Needed for Muscle Spasms. 20 tablet 0        Current Medications:    Current Facility-Administered Medications:     ibuprofen (ADVIL,MOTRIN) tablet 200 mg, 200 mg, Oral, Q4H PRN, Eliza Rice MD, 200 mg at 04/16/25 0903    Lidocaine 4 % 1 patch, 1 patch, Transdermal, Q24H, Eliza Rice MD, 1 patch at 04/16/25 0216    nicotine (NICODERM CQ) 7 MG/24HR patch 1 patch, 1 patch, Transdermal, Q24H, Eliza Rice MD, 1 patch at 04/16/25 0026    sodium chloride 0.9 % flush 10 mL, 10 mL, Intravenous, Q12H, Eliza Rice MD    sodium chloride 0.9 % flush 10 mL, 10 mL, Intravenous, PRN, Eliza Rice MD    sodium chloride 0.9 % infusion 40 mL, 40 mL, Intravenous, PRN, Eliza Rice MD    tiZANidine (ZANAFLEX) tablet 4 mg, 4 mg, Oral, Q12H PRN, Eliza Rice MD, 4 mg at 04/16/25 0216     Allergies:  No Known Allergies    Social Hx:  Social History     Socioeconomic History    Marital status: Single   Tobacco Use    Smoking status: Every Day     Current packs/day: 1.00     Average packs/day: 1 pack/day for 6.0 years (6.0 ttl pk-yrs)     Types: Cigarettes    Smokeless tobacco: Current     Types: Snuff   Vaping Use  "   Vaping status: Never Used   Substance and Sexual Activity    Alcohol use: Yes     Comment: occ    Drug use: Not Currently    Sexual activity: Defer       Family Hx:  Family History   Problem Relation Age of Onset    Hypertension Mother     Hyperlipidemia Mother     No Known Problems Father      Physical Examination:   Vital Signs:  Vitals:    04/15/25 1833 04/16/25 0142 04/16/25 0334 04/16/25 0700   BP: 144/88  150/95 150/95   BP Location: Left arm  Left arm Right arm   Patient Position: Sitting  Lying Sitting   Pulse: 120  86 114   Resp: 18  18 18   Temp: 97.9 °F (36.6 °C)  97.9 °F (36.6 °C) 97.6 °F (36.4 °C)   TempSrc: Oral  Oral Oral   SpO2: 100%  99% 100%   Weight:  77.1 kg (170 lb) 77.1 kg (170 lb)    Height: 175.3 cm (69\")  177.8 cm (70\")          General Exam:  Normal-appearing  No signs of track marks  No signs of edema    Neurologic Exam:    Mental Status:    -Awake, Alert, Oriented X 3  -No word finding difficulties  -No aphasia  -No dysarthria  -Follows simple and complex commands    CN II:  Visual fields full.  Pupils equally reactive to light  CN III, IV, VI:  Extraocular Muscles full with no signs of nystagmus  CN V:  Facial sensory is symmetric with no asymmetries.  CN VII:  Facial motor symmetric  CN VIII:  Gross hearing intact bilaterally  CN IX:  Palate elevates symmetrically  CN X:  Palate elevates symmetrically  CN XI:  Shoulder shrug symmetric  CN XII:  Tongue is midline on protrusion    Motor: (strength out of 5:  1= minimal movement, 2 = movement in plane of gravity, 3 = movement against gravity, 4 = movement against some resistance, 5 = full strength)    -Right Upper Ext: See below  -Left Upper Ext: Proximal: 5 Distal: 5    -Right Lower Ext: Proximal: 5 Distal: 5  -Left Lower Ext: Proximal: 5 Distal: 5    5/5 shoulder  Right deltoid  1  Bicep 0  Tricep 0  Supination 3  Pronation 4  Wrist extens 0  Wrist flex 1+  Apb 1+  FDI 3  ADM 0  Finger extens 0  Finger flex 3  Sensor C5 intact  C6 " "decreased  C7 decreased  C8 decreased  T1 normal    Reflexes  RUE:  Bicep 0  Tricep 0  [place] 0    Left 2+ throughout    Bilateral lower extremities show positive suprapatellar reflexes.  Nonsustained clonus at both ankles.  He tells me this is chronic and he is always had this.    Sensory:  Decree sensation throughout the right upper extremity from the shoulder downwards.  No other sensory loss throughout    Coordination:  -Finger to nose intact  -Heel to shin intact  -No ataxia    Gait  -No signs of ataxia  -ambulates unassisted    Recent Diagnostics:   Laboratory Results:   - Reviewed in EMR  Lab Results   Component Value Date    GLUCOSE 118 (H) 04/15/2025    CALCIUM 9.9 04/15/2025     04/15/2025    K 4.1 04/15/2025    CO2 26.0 04/15/2025     04/15/2025    BUN 10 04/15/2025    CREATININE 0.91 04/15/2025    BCR 11.0 04/15/2025    ANIONGAP 11.0 04/15/2025     Lab Results   Component Value Date    WBC 6.91 04/15/2025    HGB 14.4 04/15/2025    HCT 43.6 04/15/2025    MCV 93.8 04/15/2025     04/15/2025     No results found for: \"PTT\", \"INR\"  No results found for: \"CHOLTOT\", \"TRIG\", \"HDL\", \"LDL\"  No results found for: \"HGBA1C\"    Imaging Results:  Imaging Results (Last 24 Hours)       Procedure Component Value Units Date/Time    CT Cervical Spine Without Contrast [[id number]] Collected: 04/15/25 2122     Updated: 04/15/25 2129    Narrative:      CT CERVICAL SPINE WO CONTRAST- 4/15/2025 7:35 PM     HISTORY: Right upper extremity weakness and neck pain     COMPARISON: None      DOSE LENGTH PRODUCT: 403.0 mGycm. All CT scans are performed using dose  optimization techniques as appropriate to the performed exam and  including at least one of the following: Automated exposure control,  adjustment of the mA and/or kV according to size, and the use of the  iterative reconstruction technique.     TECHNIQUE: Serial helical tomographic images of the cervical spine were  obtained without the use of intravenous " contrast. Additionally, sagittal  and coronal reformatted images were also provided for review.     FINDINGS:  Alignment: Normal.     Bones: There is no evidence of fracture. Vertebral body heights are  maintained.     Disc spaces: Maintained.     Canal and neuroforamina:     C2-C3: No bulging the disc. No central or foraminal stenosis.     C3-C4: No bulging of the disc. No central or foraminal stenosis.     C4-C5: No bulging of the disc. No central or right foraminal stenosis.  Moderate left foraminal narrowing related to uncinate spurring.     C5-C6: No bulging of the disc. No central or right foraminal stenosis.  Mild left foraminal narrowing related to uncinate spurring.     C6-C7: No bulging of the disc or central stenosis. Uncinate spurring  contributes to mild to moderate bilateral neural foraminal narrowing.     C7-T1: No bulging of the disc. No central or foraminal stenosis.     Soft tissues: Unremarkable.     Lung apices: Clear. No pneumothorax.       Impression:      1. Normal alignment. No occult fracture. Disc space heights well  maintained.  2. No central stenosis. Foraminal narrowing is noted in the mid and  lower cervical spine as described in detail at each disc level above.           This report was signed and finalized on 4/15/2025 9:26 PM by Dr. Fabián Lim MD.       CT Head Without Contrast [[id number]] Collected: 04/15/25 2111     Updated: 04/15/25 2117    Narrative:      CT BRAIN without contrast dated 4/15/2025 7:35 PM     HISTORY: Right upper extremity weakness     COMPARISON: None      DOSE LENGTH PRODUCT: 699.4 mGycm. All CT scans are performed using dose  optimization techniques as appropriate to the performed exam and  including at least one of the following: Automated exposure control,  adjustment of the mA and/or kV according to size, and the use of the  iterative reconstruction technique.     In order to have a CT radiation dose as low as reasonably achievable,  Automated  Exposure Control was utilized for adjustment of the mA and/or  KV according to patient size.     TECHNIQUE: Serial axial tomographic images of the brain were obtained  without the use of intravenous contrast.     FINDINGS:  The midline structures are nondisplaced. The ventricles and basilar  cisterns are normal in size and configuration. There is no evidence of  intracranial hemorrhage or mass-effect. The gray-white matter  differentiation is maintained. The sulci have a normal configuration,  and there are no abnormal extra-axial fluid collections. The structures  of the posterior fossa are unremarkable.     The included orbits and their contents are unremarkable. The visualized  paranasal sinuses, mastoid air cells and middle ear cavities are clear.  The visualized osseous structures and overlying soft tissues of the  skull and face are unremarkable.       Impression:      1. No acute intracranial process.           This report was signed and finalized on 4/15/2025 9:14 PM by Dr. Fabián Lim MD.       US Venous Doppler Upper Extremity Right (duplex) [[id number]] Resulted: 04/15/25 1931     Updated: 04/15/25 2016    XR Outside Films [[id number]] Resulted: 04/15/25 1911     Updated: 04/15/25 1911    Narrative:      This procedure was auto-finalized with no dictation required.    XR Outside Films [[id number]] Resulted: 04/15/25 1911     Updated: 04/15/25 1911    Narrative:      This procedure was auto-finalized with no dictation required.    CT Outside Head [[id number]] Resulted: 04/15/25 1911     Updated: 04/15/25 1911    Narrative:      This procedure was auto-finalized with no dictation required.             Other labs:  CBC and CMP unremarkable  Urine drug screen negative  Fentanyl urine negative  Other RAD:  CT of the head and cervical spine reviewed by me showing no acute findings      Assessment & Plan:   Patient presenting with what I assume to be a right brachial plexopathy.  This likely is a compression  neuropathy caused by him consuming a significant amount of Xanax and then likely having altered mentation over the course of the night which would allow for him to lay on the arm for an extensive amount of time overnight.  [place]-Herr would be a consideration as well but I do not believe that he has significant amounts of pain in that arm to suggest [place]-Herr.  His cervical myelopathy could be considered as well although I think much less likely.    Impression:  Brachial plexopathy - likely compression plexopathy  Substance abuse  Tobacco abuse      Plan:   MRI of cervical spine with and without  MRI of right brachial plexus  Gabapentin 300 mg 3 times daily for symptomatic relief  Tobacco cessation counseling and substance abuse cessation counseling    Osiris Hunter, APRN  04/16/25  09:43 CDT    Medical Decision Making    Number/Complexity of Problems  Moderate  1 undiagnosed new problem with uncertain prognosis -   1 acute illness with systemic symptoms -   High  1 acute or chronic illness that poses a threat to life/body function -   High     MDM Data  Moderate - 1/3 categories  Extensive - 2/3 categories    Category 1: 3 of the following  Review of external notes  Review of results  Ordering of each unique test  Independent historian  Category 2:  Independent interpretation of test (ex: imaging)  Category 3:  Discussion of management with another provider    Extensive     Treatment Plan  Moderate - Prescription Drug management  High  Drug therapy requiring intensive monitoring for toxicity  Decision regarding hospitalization or escalation of care  De-escalate care/DNR decisions

## 2025-04-16 NOTE — ED NOTES
MD Rice at bedside; states that patient doesn't need an IV at this time, can give PO and IM medications.

## 2025-04-16 NOTE — PLAN OF CARE
Goal Outcome Evaluation:  Pt lying in bed sleeping at this time. Aox4. VSS on RA. Neurology consult today. MRI completed per orders. R arm in sling. Gabapentin started this shift. Pt tolerated well; did make pt sleepy. Safety maintained. Care continues.

## 2025-04-16 NOTE — DISCHARGE INSTRUCTIONS
Return to ER for new or worsening symptoms.    2.   Follow-up with PCP in 1 week.    3.  Follow-up with Neurology in 8 weeks.    4. Take Neurontin 100 mg 3 times a day as ordered by Dr. Coker, Neurology.    5.  Wear a sling on right arm for 4-6 weeks.    6.  Continue home medications.

## 2025-04-17 ENCOUNTER — NURSE TRIAGE (OUTPATIENT)
Dept: CALL CENTER | Facility: HOSPITAL | Age: 30
End: 2025-04-17
Payer: MEDICAID

## 2025-04-17 ENCOUNTER — TELEPHONE (OUTPATIENT)
Dept: FAMILY MEDICINE CLINIC | Facility: CLINIC | Age: 30
End: 2025-04-17
Payer: MEDICAID

## 2025-04-17 ENCOUNTER — TRANSITIONAL CARE MANAGEMENT TELEPHONE ENCOUNTER (OUTPATIENT)
Dept: CALL CENTER | Facility: HOSPITAL | Age: 30
End: 2025-04-17
Payer: MEDICAID

## 2025-04-17 RX ORDER — METHOCARBAMOL 500 MG/1
500 TABLET, FILM COATED ORAL EVERY 6 HOURS
Qty: 120 TABLET | OUTPATIENT
Start: 2025-04-17

## 2025-04-17 NOTE — THERAPY DISCHARGE NOTE
Acute Care - Physical Therapy Discharge Summary  [facility]       Patient Name: Boo Blackburn  : 1995  MRN: [id number]    Today's Date: 2025                 Admit Date: 4/15/2025      PT Recommendation and Plan    Visit Dx:    ICD-10-CM ICD-9-CM   1. Left arm weakness  R29.898 729.89   2. Numbness and tingling of right arm  R20.0 782.0    R20.2                 PT Rehab Goals       Row Name 25 1151             Bed Mobility Goal 1 (PT)    Activity/Assistive Device (Bed Mobility Goal 1, PT) supine to sit;sit to supine;other (see comments)  head of the bed in neutral  -NW      [place] Level/Cues Needed (Bed Mobility Goal 1, PT) independent  -NW      Time Frame (Bed Mobility Goal 1, PT) long term goal (LTG);10 days  -NW      Progress/Outcomes (Bed Mobility Goal 1, PT) goal not met  -NW         ROM Goal 1 (PT)    ROM Goal 1 (PT) Pt will be able to lift R UE against gravity 20% of the AROM  -NW      Time Frame (ROM Goal 1, PT) long-term goal (LTG)  -NW      Progress/Outcome (ROM Goal 1, PT) goal not met  -NW         Problem Specific Goal 1 (PT)    Problem Specific Goal 1 (PT) Pt will be able to move R wrist 50% of AROM in flexion and extension  -NW      Time Frame (Problem Specific Goal 1, PT) long-term goal (LTG);1 week  -NW      Progress/Outcome (Problem Specific Goal 1, PT) goal not met  -NW                User Key  (r) = Recorded By, (t) = Taken By, (c) = Cosigned By      Initials Name Provider Type Discipline    NW Angela Reed PTA Physical Therapist Assistant PT                        PT Discharge Summary  Anticipated Discharge Disposition (PT): home  Reason for Discharge: Discharge from facility  Outcomes Achieved: Discharge from facility occurred on same date as evluation  Discharge Destination: Home, Home with outpatient services      Angela Reed PTA   2025

## 2025-04-17 NOTE — OUTREACH NOTE
Call Center TCM Note      Flowsheet Row Responses   [facility] patient discharged from? [place]   Does the patient have one of the following disease processes/diagnoses(primary or secondary)? Other   TCM attempt successful? Yes   Call start time 1009   Call end time 1022   Meds reviewed with patient/caregiver? Yes   Is the patient having any side effects they believe may be caused by any medication additions or changes? No   Does the patient have all medications ordered at discharge? Yes   Is the patient taking all medications as directed (includes completed medication regime)? Yes   Comments PCP hospital f/u appt on 04/22/25, within TCM time frame. [facility] will make 8 week f/u appt with neurology.   Does the patient have an appointment with their PCP within 7-14 days of discharge? Yes   Has home health visited the patient within 72 hours of discharge? N/A   Psychosocial issues? No   Did the patient receive a copy of their discharge instructions? Yes   Nursing interventions Reviewed instructions with patient   What is the patient's perception of their health status since discharge? Same   Is the patient/caregiver able to teach back signs and symptoms related to disease process for when to call PCP? Yes   Is the patient/caregiver able to teach back signs and symptoms related to disease process for when to call 911? Yes   Is the patient/caregiver able to teach back the hierarchy of who to call/visit for symptoms/problems? PCP, Specialist, Home health nurse, Urgent Care, ED, 911 Yes   If the patient is a current smoker, are they able to teach back resources for cessation? Smoking cessation medications, [id number]   TCM call completed? Yes   Wrap up additional comments Patient states is about the same. States still has some right arm pain, but is controlled by gabapentin. Reports still has same weakness/numbness of right arm, and asking about outpatient PT. Will route message to PCP office for review. Denies any  other s/s. Denies any other needs today. TCM complete.   Call end time 1022   Would this patient benefit from a Referral to [facility] Social Work? No   Is the patient interested in additional calls from an ambulatory ? No            Alicia MILLER - Registered Nurse    4/17/2025, 10:23 CDT

## 2025-04-17 NOTE — TELEPHONE ENCOUNTER
Caller: Boo Blackburn    Relationship: Self    Best call back number: [phone]      What medication are you requesting: LYRICA OR OTHER MEDICATION TO HELP SYMPTOMS    What are your current symptoms: RIGHT ARM PAIN/TINGLING/UNCOMFORTABLE    How long have you been experiencing symptoms: FEW DAYS    Have you had these symptoms before:    [x] Yes  [] No    Have you been treated for these symptoms before:   [x] Yes  [] No    If a prescription is needed, what is your preferred pharmacy and phone number: [facility] - [place] - [address] - [phone] [facility] [phone] FX     Additional notes: PT WENT TO HOSPITAL REGARDING HIS ARM TINGLING AND PAIN. PT WAS PRESCRIBED GABAPENTIN AND IT HAS MADE HIM VERY SLEEPY WHERE PT WILL SLEEP THROUGHOUT A WHOLE DAY. PT IS TRYING TO GET RELIEF WITH HIS ARM AND  PT SAW WHERE LYRICA WAS ANTHER MEDICATION THAT HELPED WITH THE TINGLING/UNCOMFORTABLENESS AND IS ASKING IF PROVIDER CAN PRESCRIBE HIM THAT INSTEAD OR SOME UNTIL HIS FOLLOW UP APPT ON 4.23. PT WOULD LIKE SENT TODAY IF POSSIBLE    PT ALSO MENTIONED THAT buPROPion XL (Wellbutrin XL) 300 MG 24 hr tablet IS DUE FOR A REFILL.    PLEASE CALL BACK WITH ADVISEMENT PREFERABLY ASAP.

## 2025-04-17 NOTE — TELEPHONE ENCOUNTER
"Calling for Lyrica refill to be sent to [facility]. Requesting at least enough to last through to appt next week. Was just DCd from hospital and slept on neck/arm wrong and having discomfort. Addressed inpatient, he said, and told to give it time.       Reason for Disposition   Caller requesting a CONTROLLED substance prescription refill (e.g., narcotics, ADHD medicines)    Additional Information   Negative: New-onset or worsening symptoms, see that guideline (e.g., diarrhea, runny nose, sore throat)   Negative: Medicine question not related to refill or renewal   Negative: Caller (e.g., patient or pharmacist) requesting information about a new medicine   Negative: Caller requesting information unrelated to medicine   Negative: [1] Prescription refill request for ESSENTIAL medicine (i.e., likelihood of harm to patient if not taken) AND [2] triager unable to refill per department policy   Negative: [1] Prescription not at pharmacy AND [2] was prescribed by PCP recently  (Exception: Triager has access to EMR and prescription is recorded there. Go to Home Care and confirm for pharmacy.)   Negative: [1] Pharmacy calling with prescription questions AND [2] triager unable to answer question   Negative: Prescription request for new medicine (not a refill)    Answer Assessment - Initial Assessment Questions  1. DRUG NAME: \"What medicine do you need to have refilled?\"      Lyrica  2. REFILLS REMAINING: \"How many refills are remaining?\" (Note: The label on the medicine or pill bottle will show how many refills are remaining. If there are no refills remaining, then a renewal may be needed.)      na  3. EXPIRATION DATE: \"What is the expiration date?\" (Note: The label states when the prescription will , and thus can no longer be refilled.)      na  4. PRESCRIBING HCP: \"Who prescribed it?\" Reason: If prescribed by specialist, call should be referred to that group.      Geneva Mayfield  5. SYMPTOMS: \"Do you have any " "symptoms?\"      See charting   6. PREGNANCY: \"Is there any chance that you are pregnant?\" \"When was your last menstrual period?\"      na    Protocols used: Medication Refill and Renewal Call-ADULT-    "

## 2025-04-17 NOTE — OUTREACH NOTE
Prep Survey      Flowsheet Row Responses   [place] facility patient discharged from? [facility]   Is LACE score < 7 ? Yes   Eligibility [facility]   Date of Admission 04/15/25   Date of Discharge 04/16/25   Discharge Disposition Home or Self Care   Discharge diagnosis Arm weakness   Does the patient have one of the following disease processes/diagnoses(primary or secondary)? Other   Does the patient have Home health ordered? No   Is there a DME ordered? No   Prep survey completed? Yes            RACHEL A - Registered Nurse

## 2025-04-24 ENCOUNTER — OFFICE VISIT (OUTPATIENT)
Dept: FAMILY MEDICINE CLINIC | Facility: CLINIC | Age: 30
End: 2025-04-24
Payer: MEDICAID

## 2025-04-24 VITALS
OXYGEN SATURATION: 99 % | WEIGHT: 166 LBS | TEMPERATURE: 97.8 F | DIASTOLIC BLOOD PRESSURE: 89 MMHG | BODY MASS INDEX: 23.77 KG/M2 | HEIGHT: 70 IN | HEART RATE: 110 BPM | SYSTOLIC BLOOD PRESSURE: 150 MMHG | RESPIRATION RATE: 18 BRPM

## 2025-04-24 DIAGNOSIS — R20.2 NUMBNESS AND TINGLING OF RIGHT ARM: Primary | ICD-10-CM

## 2025-04-24 DIAGNOSIS — R20.0 NUMBNESS AND TINGLING OF RIGHT ARM: Primary | ICD-10-CM

## 2025-04-24 DIAGNOSIS — M62.81 MUSCLE WEAKNESS OF RIGHT ARM: ICD-10-CM

## 2025-04-24 PROBLEM — E29.1 MALE HYPOGONADISM: Status: ACTIVE | Noted: 2023-01-04

## 2025-04-24 PROBLEM — N32.81 OVERACTIVE BLADDER: Status: ACTIVE | Noted: 2022-10-05

## 2025-04-24 PROBLEM — N52.9 ERECTILE DYSFUNCTION: Status: ACTIVE | Noted: 2023-01-04

## 2025-04-24 NOTE — PROGRESS NOTES
Chief Complaint  Hospital Follow Up Visit    Cat Blackburn presents to [facility]  History of Present Illness  History of Present Illness  The patient presents for follow-up on right arm pain.    He sought emergency care on 04/15/2025 due to persistent right arm and neck pain, accompanied by weakness. He was admitted overnight for observation. Venous ultrasound and MRI revealed nonspecific edema within the infraspinous muscle belly on the right, most pronounced along the surface of the infraspinous and the superior aspect near the myelotendinitis junction. The etiology is uncertain and may represent a strain, denervation, or sequela of recent injury. A dedicated MRI of the right shoulder with contrast was recommended for further evaluation. MRI of the cervical spine showed no disk herniations, compressions, stenosis, or abnormalities. CT cervical spine without contrast showed normal alignment, no occult fracture, well-maintained disk space heights, no central stenosis, but foramen narrowing in the mid to lower cervical spine. CT of the head showed no intracranial process. Doppler study of the upper extremity and CBC were normal, except for a slightly elevated glucose level at 118. Urine drug screen and fentanyl screen were negative.    He reports an inability to elevate his right arm or move his fingers, which has significantly impacted his ability to work and manage his financial obligations. He describes a sensation of numbness in his right arm, akin to the feeling of having slept on it, extending from his shoulder to the tips of his fingers. He also reports an inability to pronate or supinate his right hand, and a lack of strength in his right elbow. He is right-handed and has been unable to work due to these symptoms. He recalls a recent incident where he fell asleep on his arm after consuming alcohol, which he believes may have contributed to his current  "condition. He reports no use of illicit substances. He was informed that he would require physical therapy and was scheduled for a follow-up with a neurologist in approximately one month. He has a history of similar injuries, from which he has previously recovered.    He is currently on a regimen of Wellbutrin, testosterone, and Neurontin. He was initially prescribed Neurontin 300 mg, but due to excessive drowsiness, the dosage was reduced to 100 mg. He is requesting a refill of his testosterone prescription.    SOCIAL HISTORY  He drinks beer. He does not use any illegal drugs.    The following portions of the patient's history were reviewed and updated as appropriate: allergies, current medications, past family history, past medical history, past social history, past surgical history and problem list.    Objective   Vital Signs:  /89 (BP Location: Left arm, Patient Position: Sitting, Cuff Size: Adult)   Pulse 110   Temp 97.8 °F (36.6 °C) (Infrared)   Resp 18   Ht 177.8 cm (70\")   Wt 75.3 kg (166 lb)   SpO2 99%   BMI 23.82 kg/m²   Estimated body mass index is 23.82 kg/m² as calculated from the following:    Height as of this encounter: 177.8 cm (70\").    Weight as of this encounter: 75.3 kg (166 lb).     BMI is within normal parameters. No other follow-up for BMI required.    Physical Exam  Vitals and nursing note reviewed.   Constitutional:       General: He is awake.      Appearance: Normal appearance. He is well-developed and well-groomed.   HENT:      Head: Normocephalic and atraumatic.      Right Ear: Hearing and external ear normal.      Left Ear: Hearing and external ear normal.      Nose: Nose normal.      Mouth/Throat:      Lips: Pink.      Pharynx: Oropharynx is clear.   Eyes:      General: Lids are normal.      Conjunctiva/sclera: Conjunctivae normal.   Cardiovascular:      Rate and Rhythm: Normal rate and regular rhythm.      Heart sounds: Normal heart sounds.   Pulmonary:      Effort: " Pulmonary effort is normal.      Breath sounds: Normal breath sounds and air entry.   Musculoskeletal:      Right shoulder: Tenderness present. Decreased range of motion. Decreased strength.      Left shoulder: Normal.      Right upper arm: Tenderness and bony tenderness present.      Left upper arm: Normal. Swelling present.      Right forearm: Tenderness present.      Left forearm: Normal. Swelling present.      Right wrist: Bony tenderness present. Decreased range of motion.      Left wrist: Normal.        Arms:       Cervical back: Full passive range of motion without pain.      Right lower leg: No edema.      Left lower leg: No edema.      Comments: Unable to pronate/supinate, unable to raise right arm up 10 degrees, strength is weak, difficulty in gripping and wiggling fingers, has decreased rom of the whole arm from shoulder to hand   He has the arm in a sling and can't do much with it.  He is right hand dominant    Lymphadenopathy:      Head:      Right side of head: No submental, submandibular or tonsillar adenopathy.      Left side of head: No submental, submandibular or tonsillar adenopathy.   Skin:     General: Skin is warm and dry.   Neurological:      Mental Status: He is alert and oriented to person, place, and time.      Sensory: Sensation is intact.      Motor: Motor function is intact.      Coordination: Coordination is intact.      Gait: Gait is intact.   Psychiatric:         Attention and Perception: Attention and perception normal.         Mood and Affect: Mood and affect normal.         Speech: Speech normal.         Behavior: Behavior normal. Behavior is cooperative.         Thought Content: Thought content normal.         Cognition and Memory: Cognition and memory normal.         Judgment: Judgment normal.        Physical Exam  Neurological: Awake, alert, oriented x4, no focal deficit  Head: Normocephalic, atraumatic  Neck: Supple, no abnormalities  Extremities: No edema, no  cyanosis  Musculoskeletal: Decreased strength in right arm, unable to pronate or supinate, unable to raise arm, diminished reflexes on right side    Result Review :          Results  Labs   - CBC: Normal   - CMP: Normal except for elevated glucose at 118   - Urine drug screen: Negative   - Fentanyl screen: Negative    Imaging   - Venous ultrasound of the right arm: 04/15/2025, Nonspecific edema within the infraspinous muscle belly, most pronounced along the surface of the infraspinous and the superior aspect of the infraspinous near the myotendinous junction   - MRI of the right arm: 04/15/2025, Nonspecific edema within the infraspinous muscle belly, most pronounced along the surface of the infraspinous and the superior aspect of the infraspinous near the myotendinous junction   - MRI of cervical spine: 04/15/2025, No disk herniations, compressions, stenosis or abnormalities   - CT cervical spine without contrast: 04/15/2025, Normal alignment, no occult fracture, disk space heights well maintained, no central stenosis, but foramen narrowing is noted in the mid to lower cervical spine   - CT of head: 04/15/2025, No intracranial process    Assessment and Plan     Diagnoses and all orders for this visit:    1. Numbness and tingling of right arm (Primary)  2. Muscle weakness of right arm  -     Ambulatory Referral to Physical Therapy for Evaluation & Treatment        -     Pt said he was told in ER that he had to follow up with Dr. Coker however, I don't see where an appt was made.  I called the office and he was not scheduled so I made an appt May 16, 2025 at 8:45.   I told the pt to arrive at office 15 minutes early and to bring insurance card, drivers license   See MRIs and CT   Assessment & Plan  1. Right arm pain.  - Presents with nonspecific edema in the infraspinous muscle, accompanied by numbness, tingling, and inability to use the right arm.  - Unable to pronate or supinate the hand and cannot raise the arm;  strength is weak, currently using a sling for support.  - Referral to physical therapy for further evaluation and development of a treatment plan; referral to an orthopedic specialist for additional assessment.  - Advised to continue current medication regimen until further recommendations are provided by the specialist.    2. Medication management.  - Requested a refill for testosterone; explained that this requires a specific visit, including hemoglobin, hematocrit, PSA, testosterone level test, drug screen, and control contract.  - Advised to continue seeing the current provider for testosterone management.  - Informed that gabapentin is a narcotic; any changes to this medication would require a similar process.  - Recommended to stay on the current dosage of gabapentin until further evaluation by the specialist.      ICD-10-CM ICD-9-CM   1. Numbness and tingling of right arm  R20.0 782.0    R20.2    2. Muscle weakness of right arm  M62.81 728.87                Follow Up     Return in about 3 weeks (around 5/15/2025) for Recheck.    Patient was given instructions and counseling regarding his condition or for health maintenance advice. Please see specific information pulled into the AVS if appropriate.       Patient or patient representative verbalized consent for the use of Ambient Listening during the visit with  Geneva Mayfield DNP, EMELIA for chart documentation. 4/24/2025  09:23 CDT    Electronically signed by Geneva Mayfield DNP, EMELIA, 04/24/25, 9:23 AM CDT.

## 2025-04-25 ENCOUNTER — TELEPHONE (OUTPATIENT)
Age: 30
End: 2025-04-25
Payer: MEDICAID

## 2025-04-25 NOTE — TELEPHONE ENCOUNTER
Called patient about PT referral. [facility] does not take his insurance, calling to see if I could [facility]

## 2025-04-25 NOTE — TELEPHONE ENCOUNTER
Patient has appt with Dr Coker May 16 @ 8:40. Geneva spoke with provider. No referral was needed,

## 2025-05-05 ENCOUNTER — OFFICE VISIT (OUTPATIENT)
Dept: FAMILY MEDICINE CLINIC | Facility: CLINIC | Age: 30
End: 2025-05-05
Payer: MEDICAID

## 2025-05-05 VITALS
DIASTOLIC BLOOD PRESSURE: 88 MMHG | RESPIRATION RATE: 18 BRPM | HEART RATE: 112 BPM | BODY MASS INDEX: 23.77 KG/M2 | OXYGEN SATURATION: 99 % | HEIGHT: 70 IN | WEIGHT: 166 LBS | SYSTOLIC BLOOD PRESSURE: 138 MMHG | TEMPERATURE: 98.6 F

## 2025-05-05 DIAGNOSIS — F19.20 DRUG ADDICTION: ICD-10-CM

## 2025-05-05 DIAGNOSIS — F15.10 METHAMPHETAMINE ABUSE: ICD-10-CM

## 2025-05-05 DIAGNOSIS — Z00.01 ENCOUNTER FOR WELL ADULT EXAM WITH ABNORMAL FINDINGS: Primary | ICD-10-CM

## 2025-05-05 DIAGNOSIS — R29.898 RIGHT ARM WEAKNESS: ICD-10-CM

## 2025-05-05 DIAGNOSIS — M79.601 RIGHT ARM PAIN: ICD-10-CM

## 2025-05-05 RX ORDER — DICLOFENAC SODIUM 75 MG/1
75 TABLET, DELAYED RELEASE ORAL 2 TIMES DAILY
Qty: 60 TABLET | Refills: 0 | Status: SHIPPED | OUTPATIENT
Start: 2025-05-05

## 2025-05-05 NOTE — PROGRESS NOTES
Chief Complaint  Arm Pain (Pt here for follow up)    Subjective        Boo Blackburn presents to [facility]  History of Present Illness  History of Present Illness  The patient presents for evaluation of neck pain and right arm weakness and a physical so he can go to the methadone clinic.  He has been battling drug addiction since 16 years old.  He has done many rehab programs, suboxone clinic, he tried multiple self withrawals and they didn't work.  He has a problem with meth, opiates, xanax and says he is an addict and he needs help.   In order for him to go to the methadone clinic he has to have an evaluation saying he is healthy and can attend.  It has been extremely hard for him because he woke up one morning and couldn't use his right arm, it has continued to worsen and he has been to physical therapy and that is not working.  He admits that he is using meth today, but has not had any opiates since January.   Pt wants me to prescribe his testosterone.  Which I can not do as it is a narcotic and he admits to using meth.     He was previously informed by a physician that he had sustained nerve damage in his neck, which was affecting his arm. However, his physical therapist did not concur with this assessment and did not provide further clarification. He describes a sensation of swelling in his neck, which was not visually confirmed by the physical therapist. The application of a TENS unit during his therapy session resulted in an exacerbation of his symptoms, leaving him feeling worse than before the session. He experiences muscle cramps and pain extending from his neck to his shoulder, with the severity of the pain fluctuating daily. Additionally, he reports severe muscle cramps in his forearm, which cease at the wrist and hand.    He also reports experiencing chronic back pain from time to time, which he believes is exacerbated by dehydration and physical activity.    He says  "that if he has xanax or valium it helps him get thru the day.  I explained to him that I would not prescribe any benzos or pain medication for him.        The following portions of the patient's history were reviewed and updated as appropriate: allergies, current medications, past family history, past medical history, past social history, past surgical history and problem list.    Objective   Vital Signs:  /88 (BP Location: Left arm, Patient Position: Sitting, Cuff Size: Large Adult)   Pulse 112   Temp 98.6 °F (37 °C) (Infrared)   Resp 18   Ht 177.8 cm (70\")   Wt 75.3 kg (166 lb)   SpO2 99%   BMI 23.82 kg/m²   Estimated body mass index is 23.82 kg/m² as calculated from the following:    Height as of this encounter: 177.8 cm (70\").    Weight as of this encounter: 75.3 kg (166 lb).     BMI is within normal parameters. No other follow-up for BMI required.    Physical Exam  Vitals and nursing note reviewed.   Constitutional:       General: He is awake.      Appearance: Normal appearance. He is well-developed and well-groomed.   HENT:      Head: Normocephalic and atraumatic.      Right Ear: Hearing, tympanic membrane, ear canal and external ear normal.      Left Ear: Hearing, tympanic membrane, ear canal and external ear normal.      Nose: Nose normal.      Mouth/Throat:      Lips: Pink.      Pharynx: Oropharynx is clear.   Eyes:      General: Lids are normal.      Conjunctiva/sclera: Conjunctivae normal.   Cardiovascular:      Rate and Rhythm: Normal rate and regular rhythm.      Heart sounds: Normal heart sounds.   Pulmonary:      Effort: Pulmonary effort is normal.      Breath sounds: Normal breath sounds and air entry.   Musculoskeletal:        Arms:       Cervical back: Full passive range of motion without pain.      Right lower leg: No edema.      Left lower leg: No edema.      Comments: Left arm has decreased rom, weak .  He picks it up with other hand if he needs to move it, because he just hangs " there.  He is unable to raise it even 2 inches.  Good radial pulse.      Lymphadenopathy:      Head:      Right side of head: No submental, submandibular or tonsillar adenopathy.      Left side of head: No submental, submandibular or tonsillar adenopathy.   Skin:     General: Skin is warm and dry.   Neurological:      Mental Status: He is alert and oriented to person, place, and time.      Sensory: Sensation is intact.      Motor: Motor function is intact.      Coordination: Coordination is intact.      Gait: Gait is intact.   Psychiatric:         Attention and Perception: Attention and perception normal.         Mood and Affect: Mood and affect normal.         Speech: Speech normal.         Behavior: Behavior normal. Behavior is cooperative.         Thought Content: Thought content normal.         Cognition and Memory: Cognition and memory normal.         Judgment: Judgment normal.      Physical Exam  Neck: Swelling noted in the neck.  Musculoskeletal: Nonspecific edema in the infraspinous muscle belly on the right, more pronounced along the deep surface. Muscle tightness and cramps noted from the neck to the shoulder and forearm.    Result Review :          Results  Imaging done from another provider    - CT of cervical spine: No bulging disk in any of them, soft tissues are normal, moderate left foraminal narrowing at C4-5, unate spurring at C4-5, no bulging or foramen stenosis at C5-6, mild left foramen narrowing at C5-6, no bulge of the disk or stenosis at C6-7, unate spurring contributing to mild to moderate narrowing at C6-7, normal alignment, no occult fracture, disk spaces are well maintained, no central stenosis, but foramen narrowing is noted in the mid and lower cervical spine.   - MRI of brachial plexus in upper extremity: Spinal cord was normal, brachial plexus nerves are normal, roots are normal, trunks are normal, cords are normal, nonspecific increased T2 signal along the deep surface of the  infraspinous muscle on the right, visualization of the distal infraspinous tendon is suboptimal, ribs are okay, prostheses are okay, joints are okay, no thoracic outlet, some lymph nodes are enlarged and considered reactive, nonspecific edema in the infraspinous muscle belly on the right more pronounced along the deep surface.      Assessment and Plan     Diagnoses and all orders for this visit:    1. Encounter for well adult exam with abnormal findings (Primary)    2. Methamphetamine abuse  3. Drug addiction      I recommend that he keep his appt tomorrow with the methadone clinic because he is a drug addict.       Physically other than his drug use, he has been experiencing problems with his right arm and having treatment from Dr. Coker, but doesn't have any other medical issues that would cause him to not be accepted for the clinic      4. Right arm pain  -     tiZANidine (ZANAFLEX) 4 MG tablet; Take 1 tablet by mouth 2 (Two) Times a Day.  Dispense: 60 tablet; Refill: 0  -     diclofenac (VOLTAREN) 75 MG EC tablet; Take 1 tablet by mouth 2 (Two) Times a Day.  Dispense: 60 tablet; Refill: 0    5. Right arm weakness  -     tiZANidine (ZANAFLEX) 4 MG tablet; Take 1 tablet by mouth 2 (Two) Times a Day.  Dispense: 60 tablet; Refill: 0  -     diclofenac (VOLTAREN) 75 MG EC tablet; Take 1 tablet by mouth 2 (Two) Times a Day.  Dispense: 60 tablet; Refill: 0      Assessment & Plan  1. Cervical radiculopathy.  - Moderate left foraminal narrowing and unate spurring at C4-5 are noted on the CT scan, likely causing nerve compression.  - Mild left foraminal narrowing at C5-6 and mild to moderate narrowing at C6-7 due to unate spurring are observed.  - MRI indicates nonspecific increased T2 signal along the deep surface of the infraspinous muscle on the right, suggesting possible denervation or recent injury.  - A dedicated MRI of the right shoulder without contrast is recommended for further characterization of the  muscle.      ICD-10-CM ICD-9-CM   1. Encounter for well adult exam with abnormal findings  Z00.01 V70.0   2. Methamphetamine abuse  F15.10 305.70   3. Drug addiction  F19.20 304.90   4. Right arm pain  M79.601 729.5   5. Right arm weakness  R29.898 729.89                Follow Up     Return in about 1 month (around 6/5/2025) for Recheck.  Patient was given instructions and counseling regarding his condition or for health maintenance advice. Please see specific information pulled into the AVS if appropriate.       Patient or patient representative verbalized consent for the use of Ambient Listening during the visit with  Geneva Mayfield DNP, EMELIA for chart documentation. 5/5/2025  16:54 CDT    Electronically signed by Geneva Mayfield DNP, EMELIA, 05/05/25, 4:54 PM CDT.

## 2025-05-06 ENCOUNTER — NURSE TRIAGE (OUTPATIENT)
Dept: CALL CENTER | Facility: HOSPITAL | Age: 30
End: 2025-05-06
Payer: MEDICAID

## 2025-05-06 NOTE — TELEPHONE ENCOUNTER
Caller states PCP, Geneva Mayfield, provided him with paperwork that he needs to attend methadone clinic in a.m. documenting his OV/physical completed 05/05/2025. States he cannot find paperwork now and is checking to see if can be emailed, faxed or other.    Advised caller that office is now closed and he will need to call back when office is open in a.m. and they will be able to provide that for him. Verbalized understanding.    Offered reassurance to caller as he expressed nervousness about going to methadone clinic.    Reason for Disposition   Nursing judgment    Additional Information   Negative: Nursing judgment   Negative: Information only call; adult is not ill or injured   Negative: Nursing judgment   Negative: Nursing judgment   Negative: Nursing judgment   Negative: Nursing judgment   Negative: Nursing judgment   Negative: Nursing judgment   Negative: Nursing judgment   Negative: Nursing judgment   Negative: Nursing judgment   Negative: Nursing judgment   Negative: Nursing judgment   Negative: Nursing judgment   Negative: Nursing judgment    Protocols used: No Guideline or Reference Available-ADULT-AH

## 2025-05-07 ENCOUNTER — TELEPHONE (OUTPATIENT)
Dept: FAMILY MEDICINE CLINIC | Facility: CLINIC | Age: 30
End: 2025-05-07
Payer: MEDICAID

## 2025-05-07 DIAGNOSIS — F32.89 OTHER DEPRESSION: ICD-10-CM

## 2025-05-07 RX ORDER — BUPROPION HYDROCHLORIDE 300 MG/1
300 TABLET ORAL DAILY
Qty: 30 TABLET | Refills: 1 | Status: SHIPPED | OUTPATIENT
Start: 2025-05-07

## 2025-05-07 NOTE — TELEPHONE ENCOUNTER
Caller: Boo Blackburn    Relationship to patient: Self    Best call back number:     [phone]       Patient is needing: LOST THE COPY OF PAPERS PROVIDER PROVIDED TO LET PATIENT GET IN METHADONE CLINIC. CAN HE STOP BY AND GET ANOTHER COPY?

## 2025-05-07 NOTE — TELEPHONE ENCOUNTER
Caller: Boo Blackburn    Relationship: Self    Best call back number: [phone]     Requested Prescriptions:   Requested Prescriptions     Pending Prescriptions Disp Refills    buPROPion XL (Wellbutrin XL) 300 MG 24 hr tablet 30 tablet 1     Sig: Take 1 tablet by mouth Daily.        Pharmacy where request should be sent: [facility] [place], [place] - [address] - [phone]  - [phone]     Last office visit with prescribing clinician: 5/5/2025   Last telemedicine visit with prescribing clinician: Visit date not found   Next office visit with prescribing clinician: 6/13/2025     Additional details provided by patient:     Does the patient have less than a 3 day supply:  [x] Yes  [] No    Would you like a call back once the refill request has been completed: [x] Yes [] No    If the office needs to give you a call back, can they leave a voicemail: [x] Yes [] No    Tori Sarah Rep   05/07/25 15:00 CDT

## 2025-05-08 NOTE — TELEPHONE ENCOUNTER
Duplicate encounter.   Patient reports that he came by office on previous date to request records.

## 2025-05-16 ENCOUNTER — LAB (OUTPATIENT)
Dept: LAB | Facility: HOSPITAL | Age: 30
End: 2025-05-16
Payer: MEDICAID

## 2025-05-16 ENCOUNTER — OFFICE VISIT (OUTPATIENT)
Dept: NEUROLOGY | Facility: CLINIC | Age: 30
End: 2025-05-16
Payer: MEDICAID

## 2025-05-16 ENCOUNTER — RESULTS FOLLOW-UP (OUTPATIENT)
Dept: LAB | Facility: HOSPITAL | Age: 30
End: 2025-05-16
Payer: MEDICAID

## 2025-05-16 VITALS
SYSTOLIC BLOOD PRESSURE: 162 MMHG | DIASTOLIC BLOOD PRESSURE: 100 MMHG | HEIGHT: 70 IN | HEART RATE: 116 BPM | WEIGHT: 166 LBS | BODY MASS INDEX: 23.77 KG/M2 | OXYGEN SATURATION: 98 %

## 2025-05-16 DIAGNOSIS — F11.20 PATIENT ON METHADONE MAINTENANCE THERAPY: ICD-10-CM

## 2025-05-16 DIAGNOSIS — Z79.899 CONTROLLED SUBSTANCE AGREEMENT SIGNED: ICD-10-CM

## 2025-05-16 DIAGNOSIS — F19.90 ILLICIT DRUG USE: ICD-10-CM

## 2025-05-16 DIAGNOSIS — Z51.81 THERAPEUTIC DRUG MONITORING: ICD-10-CM

## 2025-05-16 DIAGNOSIS — G54.0 BRACHIAL PLEXOPATHY: Primary | ICD-10-CM

## 2025-05-16 LAB
AMPHET+METHAMPHET UR QL: POSITIVE
AMPHETAMINES UR QL: POSITIVE
BARBITURATES UR QL SCN: NEGATIVE
BENZODIAZ UR QL SCN: POSITIVE
BUPRENORPHINE SERPL-MCNC: NEGATIVE NG/ML
CANNABINOIDS SERPL QL: POSITIVE
COCAINE UR QL: POSITIVE
FENTANYL UR-MCNC: POSITIVE NG/ML
METHADONE UR QL SCN: POSITIVE
OPIATES UR QL: POSITIVE
OXYCODONE UR QL SCN: NEGATIVE
PCP UR QL SCN: NEGATIVE
TRICYCLICS UR QL SCN: NEGATIVE

## 2025-05-16 PROCEDURE — 80307 DRUG TEST PRSMV CHEM ANLYZR: CPT

## 2025-05-16 RX ORDER — GABAPENTIN 100 MG/1
100 CAPSULE ORAL 3 TIMES DAILY
Qty: 90 CAPSULE | Refills: 0 | Status: CANCELLED | OUTPATIENT
Start: 2025-05-16

## 2025-05-16 RX ORDER — METHADONE HYDROCHLORIDE 10 MG/1
TABLET ORAL
COMMUNITY

## 2025-05-16 NOTE — LETTER
Boojunito Blackburn  [address] [address]    May 16, 2025     Dear Mr. Blackburn:    Below are the results from your recent visit:    Resulted Orders   Urine Drug Screen - Urine, Clean Catch   Result Value Ref Range    THC, Screen, Urine Positive (A) Negative    Phencyclidine (PCP), Urine Negative Negative    Cocaine Screen, Urine Positive (A) Negative    Methamphetamine, Ur Positive (A) Negative    Opiate Screen Positive (A) Negative    Amphetamine Screen, Urine Positive (A) Negative    Benzodiazepine Screen, Urine Positive (A) Negative    Tricyclic Antidepressants Screen Negative Negative    Methadone Screen, Urine Positive (A) Negative    Barbiturates Screen, Urine Negative Negative    Oxycodone Screen, Urine Negative Negative    Buprenorphine, Screen, Urine Negative Negative   Fentanyl, Urine - Urine, Clean Catch   Result Value Ref Range    Fentanyl, Urine Positive (A) Negative       I am concerned with the amount of illicit drugs you tested positive for. In doing so, I will not be renewing the gabapentin. The risk of adverse effects is to great. I recommend you continue physical therapy and use of sling. Plexopathies can take time to heal. I will not recommend any further medications as they can all interact with the illicit drugs you have been using.     If you have any questions or concerns, please don't hesitate to call.         Sincerely,        EMELIA De La Rosa

## 2025-05-16 NOTE — PROGRESS NOTES
Geneva,   Just wanted to make you aware of today's visit. He did test positive for several illicit substances so we will not be continuing the gabapentin that was given while admitted. Additionally, he is not taking it as prescribed and only PRN.  Please let me know if any questions.   Thanks,   Iraj

## 2025-05-16 NOTE — TELEPHONE ENCOUNTER
Patient's urine structuring resulted with positivity for THC, cocaine, methamphetamine, opiate, amphetamines, benzodiazepine and fentanyl.  He does show positive for methadone however was recently prescribed this.    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  We continue the gabapentin 100 mg, 1 tablet 3 times daily that was prescribed in the hospital.  At this time we will not be continuing this prescription.  Also would not be prescribing any further medications for the plexopathy.  These all have the potential to interact with the amount of illicit substances in his system.  I did contact the pharmacy and spoke with Erika Muñoz, pharmacist with [facility].  She confirmed the prescription will be canceled.    I did send a formal letter to the patient with a copy of his urine drug screen indicating the above.  I will continue recommending physical therapy and utilization of his sling as previously discussed during the visit this morning.    Progress Notes by Iraj Car APRN (05/16/2025 08:45)     Urine Drug Screen - Urine, Clean Catch (05/16/2025 10:03)  Fentanyl, Urine - Urine, Clean Catch (05/16/2025 10:03)        EMELIA Metzger

## 2025-05-16 NOTE — PROGRESS NOTES
Neurology Consult Note    [facility]  [address], [address]  [place] [address]  Phone: [phone]  Fax: [phone]    Referring Provider:   No ref. provider found  Primary Care Provider:  Geneva Mayfield, DNP, APRN    Reason for Consult:  Hospital follow-up  Subjective      Chief Complaint   Patient presents with    Paralysis     In the right arm has improved some got really worse for a few weeks and this last week it has had a major improvement.       History of Present Illness  29-year-old male seen for hospital follow-up.  Patient presented to emergency room on 4/15/2025 with complaints of numbness and tingling diffusely through his right upper extremity, neck pain and swelling in his right hand.  Patient was seen at [facility] with a CT head, C-spine x-ray the shoulder.  Patient's mother requested a higher level care for unclear reasons.    Patient was seen by neurology on 4/16/2025.  Review of that note shows patient is a history of polysubstance abuse including cocaine, methamphetamines, Xanax, Lyrica and opiates.  Reportedly 4 nights prior to presentation, he took a large quantity of Xanax and then went to sleep.  Patient woke the following morning could not move his right upper extremity.  Patient reported severe paresthesias in the right arm as well.  He denied any neck pain.  Denied any bulbar features.  Denies any bowel or bladder incontinence.  Denies any diplopia.  Denies any dysphagia.  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  Also has a brief history of atrial fibrillation surrounding the heart attacks but not on any therapy for this.  Patient did have a MRI of the cervical spine without contrast as well as an MRI of his right brachial plexus.  He started on gabapentin 300 mg 3 times daily due to the paresthesias.  This made patient very drowsy and he was switched to the 100 mg 3 times daily dosing.    Today  patient presents with his mother.  Reports to me some improvement of his symptoms since last being seen.  He does continue with some paresthesias in his hand.  He also notes difficulty with range of motion with his shoulder.  He notes unable to lift his shoulder.  He denies any significant weakness in his hands.  He does asked me switch his gabapentin to Robaxin as this helped more with his symptoms.  However we never prescribed Robaxin this was a medication that was listed prior to presentation to the hospital.    He does endorse to me he is seeing physical therapy through [place] [facility].  He is only had 1 round of therapy.    He is not wearing his sling as previously instructed.    He does asked me to fill out long-term disability.  I did tell him I would not be filling out long-term disability paperwork.  Patient can take my notes and apply towards disability if he would like.  He may continue to work however may require a altered schedule.    It is also noted that he is now on methadone.  This is for substance abuse.  We did have a discussion that continued use of controlled substances will require a controlled substance agreement.  Patient did review this and signed in clinic with his mother as a witness.  Patient Active Problem List   Diagnosis    Lung nodule    Nicotine dependence, cigarettes, uncomplicated    Acne vulgaris    Attention deficit hyperactivity disorder (ADHD)    Overweight    Arm weakness    Numbness and tingling of right arm    Erectile dysfunction    Male hypogonadism    Overactive bladder        Past Medical History:   Diagnosis Date    Afib     Heart attack     mild    Myocardial bridge         Social History     Socioeconomic History    Marital status: Single   Tobacco Use    Smoking status: Every Day     Current packs/day: 1.00     Average packs/day: 1 pack/day for 6.0 years (6.0 ttl pk-yrs)     Types: Cigarettes    Smokeless tobacco: Current     Types: Snuff  "  Vaping Use    Vaping status: Some Days    Substances: Nicotine, THC, CBD, Flavoring    Devices: Disposable   Substance and Sexual Activity    Alcohol use: Yes     Comment: occ    Drug use: Not Currently    Sexual activity: Defer        No Known Allergies       Current Outpatient Medications:     buPROPion XL (Wellbutrin XL) 300 MG 24 hr tablet, Take 1 tablet by mouth Daily., Disp: 30 tablet, Rfl: 1    diclofenac (VOLTAREN) 75 MG EC tablet, Take 1 tablet by mouth 2 (Two) Times a Day., Disp: 60 tablet, Rfl: 0    methadone (DOLOPHINE) 10 MG tablet, Take by mouth Do the titration,, Disp: , Rfl:     Testosterone Cypionate (DEPOTESTOTERONE CYPIONATE) 200 MG/ML injection, Inject 0.5 mL into the appropriate muscle as directed by prescriber Every 14 (Fourteen) Days., Disp: , Rfl:     tiZANidine (ZANAFLEX) 4 MG tablet, Take 1 tablet by mouth 2 (Two) Times a Day., Disp: 60 tablet, Rfl: 0    gabapentin (NEURONTIN) 100 MG tablet, Take 1 tablet by mouth 3 (Three) Times a Day., Disp: 90 tablet, Rfl: 0       Objective   Vital Signs:   /100   Pulse 116   Ht 177.8 cm (70\")   Wt 75.3 kg (166 lb)   SpO2 98%   BMI 23.82 kg/m²       Physical Exam  Vitals and nursing note reviewed.   Constitutional:       Appearance: Normal appearance.   HENT:      Head: Normocephalic.   Eyes:      General: Lids are normal.      Extraocular Movements: Extraocular movements intact.   Pulmonary:      Effort: Pulmonary effort is normal. No respiratory distress.   Musculoskeletal:      Right shoulder: No tenderness, bony tenderness or crepitus. Decreased range of motion.   Skin:     General: Skin is warm and dry.   Neurological:      Mental Status: He is alert.      Deep Tendon Reflexes: Reflexes are normal and symmetric.   Psychiatric:         Attention and Perception: Attention normal.         Mood and Affect: Mood is anxious.         Speech: Speech normal.         Behavior: Behavior is cooperative.         Thought Content: Thought content " normal.        Neurological Exam  Mental Status  Alert. Oriented to person, place, time and situation. Speech is normal. Language is fluent with no aphasia.    Cranial Nerves  CN III, IV, VI: Extraocular movements intact bilaterally. Normal lids and orbits bilaterally.  CN VII: Full and symmetric facial movement.  CN IX, X: Palate elevates symmetrically  CN XII: Tongue midline without atrophy or fasciculations.    Motor  Normal muscle bulk throughout. No fasciculations present. Normal muscle tone. No abnormal involuntary movements.   equal.  He is able to push and pull.  However limited abduction, flexion and extension of shoulder..    Sensory  Light touch is normal in upper and lower extremities.     Reflexes  Deep tendon reflexes are 2+ and symmetric in all four extremities.    Gait  Casual gait is normal including stance, stride, and arm swing.      Result Review :   The following data was reviewed by: EMELIA De La Rosa on 05/16/2025:       ED Provider Notes by Serjio Galvan MD (04/15/2025 18:36)  Consults by Serjio Coker MD (04/16/2025 09:43)  Progress Notes by Serjio Coker MD (04/16/2025 15:25)  Discharge Summary by Catalina Castro APRN (04/16/2025 17:36)    CT Head Without Contrast (04/15/2025 20:42)    Study Result    Narrative & Impression   CT BRAIN without contrast dated 4/15/2025 7:35 PM     HISTORY: Right upper extremity weakness     COMPARISON: None      DOSE LENGTH PRODUCT: 699.4 mGycm. All CT scans are performed using dose  optimization techniques as appropriate to the performed exam and  including at least one of the following: Automated exposure control,  adjustment of the mA and/or kV according to size, and the use of the  iterative reconstruction technique.     In order to have a CT radiation dose as low as reasonably achievable,  Automated Exposure Control was utilized for adjustment of the mA and/or  KV according to patient size.     TECHNIQUE: Serial axial tomographic  images of the brain were obtained  without the use of intravenous contrast.     FINDINGS:  The midline structures are nondisplaced. The ventricles and basilar  cisterns are normal in size and configuration. There is no evidence of  intracranial hemorrhage or mass-effect. The gray-white matter  differentiation is maintained. The sulci have a normal configuration,  and there are no abnormal extra-axial fluid collections. The structures  of the posterior fossa are unremarkable.     The included orbits and their contents are unremarkable. The visualized  paranasal sinuses, mastoid air cells and middle ear cavities are clear.  The visualized osseous structures and overlying soft tissues of the  skull and face are unremarkable.     IMPRESSION:  1. No acute intracranial process.           This report was signed and finalized on 4/15/2025 9:14 PM by Dr. Fabián Lim MD.     CT Cervical Spine Without Contrast (04/15/2025 20:42)  Study Result    Narrative & Impression   CT CERVICAL SPINE WO CONTRAST- 4/15/2025 7:35 PM     HISTORY: Right upper extremity weakness and neck pain     COMPARISON: None      DOSE LENGTH PRODUCT: 403.0 mGycm. All CT scans are performed using dose  optimization techniques as appropriate to the performed exam and  including at least one of the following: Automated exposure control,  adjustment of the mA and/or kV according to size, and the use of the  iterative reconstruction technique.     TECHNIQUE: Serial helical tomographic images of the cervical spine were  obtained without the use of intravenous contrast. Additionally, sagittal  and coronal reformatted images were also provided for review.     FINDINGS:  Alignment: Normal.     Bones: There is no evidence of fracture. Vertebral body heights are  maintained.     Disc spaces: Maintained.     Canal and neuroforamina:     C2-C3: No bulging the disc. No central or foraminal stenosis.     C3-C4: No bulging of the disc. No central or foraminal  stenosis.     C4-C5: No bulging of the disc. No central or right foraminal stenosis.  Moderate left foraminal narrowing related to uncinate spurring.     C5-C6: No bulging of the disc. No central or right foraminal stenosis.  Mild left foraminal narrowing related to uncinate spurring.     C6-C7: No bulging of the disc or central stenosis. Uncinate spurring  contributes to mild to moderate bilateral neural foraminal narrowing.     C7-T1: No bulging of the disc. No central or foraminal stenosis.     Soft tissues: Unremarkable.     Lung apices: Clear. No pneumothorax.     IMPRESSION:  1. Normal alignment. No occult fracture. Disc space heights well  maintained.  2. No central stenosis. Foraminal narrowing is noted in the mid and  lower cervical spine as described in detail at each disc level above.           This report was signed and finalized on 4/15/2025 9:26 PM by Dr. Fabián Lim MD.     MRI Cervical Spine Without Contrast (04/16/2025 11:35)    Study Result    Narrative & Impression   EXAMINATION: MRI CERVICAL SPINE WO CONTRAST-     4/16/2025 9:30 AM     HISTORY: right arm weakness; R29.898-Other symptoms and signs involving  the musculoskeletal system     Images are stored in PACS per institutional protocol.     MR imaging of the cervical spine performed without contrast.  Axial and sagittal sequences.  Additional oblique sagittal sequences.     Many of the images are affected by patient motion.     Appropriate lordotic curvature and vertebral body alignment.  Normal prevertebral soft tissues.  No abnormal cord signal.     C2-C3:  No significant disc abnormality or stenosis.     C3-C4:  No significant disc abnormality or stenosis.     C4-C5:  No significant disc abnormality or stenosis.     C5-C6:  No significant disc abnormality or stenosis.     C6-C7:  No significant disc abnormality or stenosis.     C7-T1:  No focal disc abnormality or stenosis.     IMPRESSION:  Impression:  1. No focal disc herniation,  cord compression, or significant stenosis.                    This report was signed and finalized on 4/16/2025 12:13 PM by Dr. Steve Acosta MD.     MRI brachial plexus upper extremity right w wo contrast (04/16/2025 12:02)    Study Result    Narrative & Impression   EXAMINATION: MRI BRACHIAL PLEXUS UPPER EXTREMITY RIGHT W WO CONTRAST-  4/16/2025 9:55 AM     HISTORY: right arm weakness and numbness; R29.898-Other symptoms and  signs involving the musculoskeletal system. Possible compression  brachial plexopathy on the RIGHT.     COMPARISON: MRI cervical spine 4/16/2025 and outside CT dated 4/15/2025     TECHNIQUE: Multiplanar, multiphasic MR images of the brachial plexus  were obtained before and after the intravenous infusion of 8 mL  intravenous Vueway contrast.     SPINAL CORD:  Normal caliber and signal of visualized spinal cord.     BRACHIAL PLEXUS:  Roots: Normal.  Trunks: Normal.  Divisions: Normal.  Cords: Normal.  Branches: Normal.     MUSCLES AND TENDONS:  Muscles and tendons: Nonspecific increased T2 signal along the deep  surface of the infraspinatus muscle on the RIGHT. Intramuscular signal  extends towards the myotendinous junction and visualization of the  distal infraspinatus tendon is suboptimal on this examination. Normal T2  signal in the supraspinatus and subscapularis.     BONES:  Cervical ribs:  None.  C7 transverse processes: Normal  Marrow signal: Normal.     JOINTS:  Normal cervical spine articulations, sternoclavicular joints,  acromioclavicular joints, and glenohumeral joints.     THORACIC OUTLET / CERVICOTHORACIC JUNCTION:  Normal interscalene triangle, costoclavicular space, and retropectoralis  minor space.     OTHER FINDINGS:  Some scattered cervical lymph nodes bilaterally, most likely reactive.  This is slightly more pronounced on the RIGHT than the LEFT. There are  also mildly prominent axillary lymph nodes bilaterally.        IMPRESSION:     1.  Nonspecific edema within the  infraspinatus muscle belly on the RIGHT  most pronounced along the deep surface of infraspinatus and the superior  aspect of infraspinatus near the myotendinous junction. This is of  uncertain etiology and could represent a strain, denervation change, or  sequelae of recent injury but is nonspecific. I don't definitively see  evidence of a true plexopathy on this exam. Dedicated MRI of the RIGHT  shoulder without contrast may be beneficial for further characterization  of the infraspinatus muscle belly and distal tendon.      2.  Some mildly prominent cervical and axillary lymph nodes may be  reactive but are nonspecific.     This report was signed and finalized on 4/16/2025 12:45 PM by Dr. Serjio Chirinos MD.     MEDICATIONS - SCAN - controled substance agreement 5/16/25 (05/16/2025)                Impression:  Boo Blackburn is a 29 y.o. male who presents for follow-up of compressive brachial plexopathy of right.  Etiology likely due to patient taking send amount of Xanax and sleeping in 1 position for extended period of time.  He did awaken with symptoms of paresthesias.  Since then he has told him he has had some improvement in his symptoms.  I am concerned about treatment with gabapentin.  This is due to his history of polysubstance abuse.  However with gabapentin being a schedule V of controlled substance this is low his risk for abuse potential.  I did discuss with patient that continued treatment will require him to review the controlled substance agreement form as well as the contract.  We did discuss that any abuse, failure to comply with drug screens, abnormal drug screens or other deviation from the contract can result in discharge of the patient provider relationship.  He voiced understanding.  Regards to physical therapy, I do recommend him to continue physical therapy.  I do not feel that there is any type of surgical treatment for him.  I do encourage him to utilize a sling with  ambulation.    Diagnoses and all orders for this visit:    1. Brachial plexopathy (Primary)  -     gabapentin (NEURONTIN) 100 MG tablet; Take 1 tablet by mouth 3 (Three) Times a Day.  Dispense: 90 tablet; Refill: 0    2. Therapeutic drug monitoring  -     Urine Drug Screen - Urine, Clean Catch; Future    3. Illicit drug use    4. Patient on methadone maintenance therapy    5. Controlled substance agreement signed        Plan:  Continue gabapentin 100 mg capsule, 1 capsule 3 times daily scheduled  Controlled substance agreement signed  Urine drug screen today  Will repeat urine drug screen at next visit  Continue physical therapy  Recommend utilization of sling with ambulation.  Activities as tolerated  Follow-up with primary care as scheduled  Follow-up in my clinic 6 weeks or sooner if needed    The patient and I have discussed the plan of care and he is in full agreement at this time.     Follow Up   Return in about 6 weeks (around 6/27/2025).            EMELIA De La Rosa  05/16/25  09:58 CDT

## 2025-06-03 ENCOUNTER — OFFICE VISIT (OUTPATIENT)
Dept: FAMILY MEDICINE CLINIC | Facility: CLINIC | Age: 30
End: 2025-06-03
Payer: MEDICAID

## 2025-06-03 VITALS
HEIGHT: 70 IN | TEMPERATURE: 98.6 F | DIASTOLIC BLOOD PRESSURE: 90 MMHG | RESPIRATION RATE: 18 BRPM | WEIGHT: 168 LBS | BODY MASS INDEX: 24.05 KG/M2 | HEART RATE: 97 BPM | SYSTOLIC BLOOD PRESSURE: 141 MMHG | OXYGEN SATURATION: 99 %

## 2025-06-03 DIAGNOSIS — J02.9 PHARYNGITIS, UNSPECIFIED ETIOLOGY: Primary | ICD-10-CM

## 2025-06-03 DIAGNOSIS — M79.601 RIGHT ARM PAIN: ICD-10-CM

## 2025-06-03 DIAGNOSIS — F19.10 DRUG ABUSE: ICD-10-CM

## 2025-06-03 LAB
EXPIRATION DATE: NORMAL
INTERNAL CONTROL: NORMAL
Lab: NORMAL
S PYO AG THROAT QL: NEGATIVE

## 2025-06-03 PROCEDURE — 1160F RVW MEDS BY RX/DR IN RCRD: CPT | Performed by: NURSE PRACTITIONER

## 2025-06-03 PROCEDURE — 87880 STREP A ASSAY W/OPTIC: CPT | Performed by: NURSE PRACTITIONER

## 2025-06-03 PROCEDURE — 1159F MED LIST DOCD IN RCRD: CPT | Performed by: NURSE PRACTITIONER

## 2025-06-03 PROCEDURE — 99214 OFFICE O/P EST MOD 30 MIN: CPT | Performed by: NURSE PRACTITIONER

## 2025-06-03 PROCEDURE — 1125F AMNT PAIN NOTED PAIN PRSNT: CPT | Performed by: NURSE PRACTITIONER

## 2025-06-03 RX ORDER — PREDNISONE 20 MG/1
20 TABLET ORAL DAILY
Qty: 5 TABLET | Refills: 0 | Status: SHIPPED | OUTPATIENT
Start: 2025-06-03 | End: 2025-06-08

## 2025-06-03 NOTE — PROGRESS NOTES
"Chief Complaint  Sore Throat    Subjective        Boo Blackburn presents to [facility]  History of Present Illness  History of Present Illness  The patient presents for evaluation of a sore throat.    He reports experiencing mild congestion and a sore throat, which he rates as 5 out of 10 in severity. He has not been self-medicating with Tylenol or any other over-the-counter medications. He has not had any fever or headache. He has been exposed to strep.    Supplemental Information  He injured his right arm and was having problems. Last visit, he could not even raise his arm at all. He has been doing physical therapy and that is improving. Now, he has an improved mobility about 75%. They just renewed it last time.    He also has a drug problem.  He tells me that he got into the methadone clinic and it has been almost 3 weeks since he used meth or any other drugs.  He did have a positive drug screen in ER.  Refer to it.     The following portions of the patient's history were reviewed and updated as appropriate: allergies, current medications, past family history, past medical history, past social history, past surgical history and problem list.    Objective   Vital Signs:  /90 (BP Location: Left arm, Patient Position: Sitting, Cuff Size: Large Adult)   Pulse 97   Temp 98.6 °F (37 °C) (Infrared)   Resp 18   Ht 177.8 cm (70\")   Wt 76.2 kg (168 lb)   SpO2 99%   BMI 24.11 kg/m²   Estimated body mass index is 24.11 kg/m² as calculated from the following:    Height as of this encounter: 177.8 cm (70\").    Weight as of this encounter: 76.2 kg (168 lb).     BMI is within normal parameters. No other follow-up for BMI required.    Physical Exam  Vitals and nursing note reviewed.   Constitutional:       General: He is awake.      Appearance: Normal appearance. He is well-developed and well-groomed.   HENT:      Head: Normocephalic and atraumatic.      Right Ear: Hearing, tympanic " membrane, ear canal and external ear normal.      Left Ear: Hearing, tympanic membrane, ear canal and external ear normal.      Nose: Congestion present.      Mouth/Throat:      Lips: Pink.      Mouth: Mucous membranes are moist.      Pharynx: Posterior oropharyngeal erythema present.   Eyes:      General: Lids are normal.      Conjunctiva/sclera: Conjunctivae normal.   Cardiovascular:      Rate and Rhythm: Normal rate and regular rhythm.      Heart sounds: Normal heart sounds.   Pulmonary:      Effort: Pulmonary effort is normal.      Breath sounds: Normal breath sounds and air entry.   Musculoskeletal:      Right upper arm: Tenderness present.      Left upper arm: Normal.        Arms:       Cervical back: Full passive range of motion without pain.      Right lower leg: No edema.      Left lower leg: No edema.      Comments: Has improved rom of th eright arm   Lymphadenopathy:      Head:      Right side of head: No submental, submandibular or tonsillar adenopathy.      Left side of head: No submental, submandibular or tonsillar adenopathy.   Skin:     General: Skin is warm and dry.   Neurological:      Mental Status: He is alert.      Sensory: Sensation is intact.      Motor: Motor function is intact.      Coordination: Coordination is intact.      Gait: Gait is intact.   Psychiatric:         Attention and Perception: Attention and perception normal.         Mood and Affect: Mood and affect normal.         Speech: Speech normal.         Behavior: Behavior normal. Behavior is cooperative.         Thought Content: Thought content normal.         Cognition and Memory: Cognition and memory normal.         Judgment: Judgment normal.        Physical Exam      Result Review :          Results  POCT rapid strep A  Order: [id number]   Status: Final result       Dx: Pharyngitis, unspecified etiology    Test Result Released: No (scheduled for 6/3/2025 10:28 AM)    Specimen Information: Swab         Component  Ref Range & Units  (mayte) 09:28  (6/3/25) 1 yr ago  (1/8/24) 1 yr ago  (6/26/23)   Rapid Strep A Screen Negative Positive Abnormal  Positive Abnormal    Internal Control Passed Passed Passed   Lot Number [id number]   Expiration Date 1,082,026 01/01/2025 9,072,024   Resulting Agency [facility]             Specimen Collected: 06/03/25 09:28 CDT Last Resulted: 06/03/25 09:28 CDT            Assessment and Plan     Diagnoses and all orders for this visit:    1. Pharyngitis, unspecified etiology (Primary)  -     predniSONE (DELTASONE) 20 MG tablet; Take 1 tablet by mouth Daily for 5 days.  Dispense: 5 tablet; Refill: 0         -     salt water gargles 3-4 times a day    2. Right arm pain        -   continue with physical therapy       3. Drug abuse        -   continue going to the methadone clinice         -  decrease illegal drug use.     Assessment & Plan  1. Pharyngitis.  He reports a sore throat with a pain level of 5 out of 10, accompanied by congestion. He has been exposed to strep. A strep test will be conducted to confirm the diagnosis.      ICD-10-CM ICD-9-CM   1. Pharyngitis, unspecified etiology  J02.9 462   2. Right arm pain  M79.601 729.5     Continue meds as prescribed   I do not prescribe testosterone or gabapentin, gets from another provider    Current Outpatient Medications on File Prior to Visit   Medication Sig Dispense Refill    buPROPion XL (Wellbutrin XL) 300 MG 24 hr tablet Take 1 tablet by mouth Daily. 30 tablet 1    diclofenac (VOLTAREN) 75 MG EC tablet Take 1 tablet by mouth 2 (Two) Times a Day. 60 tablet 0    gabapentin (NEURONTIN) 100 MG tablet Take 1 tablet by mouth 3 (Three) Times a Day. 90 tablet 0    methadone (DOLOPHINE) 10 MG tablet Take by mouth Do the titration,      Testosterone Cypionate (DEPOTESTOTERONE CYPIONATE) 200 MG/ML injection Inject 0.5 mL into the appropriate muscle as directed by  prescriber Every 14 (Fourteen) Days.      tiZANidine (ZANAFLEX) 4 MG tablet Take 1 tablet by mouth 2 (Two) Times a Day. 60 tablet 0     No current facility-administered medications on file prior to visit.                Follow Up     Return in about 1 week (around 6/10/2025), or if symptoms worsen or fail to improve.  Patient was given instructions and counseling regarding his condition or for health maintenance advice. Please see specific information pulled into the AVS if appropriate.       Patient or patient representative verbalized consent for the use of Ambient Listening during the visit with  Geneva Mayfield DNP, EMELIA for chart documentation. 6/3/2025  09:09 CDT    Electronically signed by Geneva Mayfield DNP, APRN, 06/03/25, 9:09 AM CDT.

## 2025-06-13 ENCOUNTER — TELEPHONE (OUTPATIENT)
Dept: NEUROLOGY | Facility: CLINIC | Age: 30
End: 2025-06-13
Payer: MEDICAID

## 2025-06-13 NOTE — TELEPHONE ENCOUNTER
Called pt to get there appointment for 6/26/25 rescheduled as [facility] has training that day and will not be seeing pt's Left message to call the office back.  HUB may relay and reschedule   .

## 2025-06-16 ENCOUNTER — TELEPHONE (OUTPATIENT)
Dept: NEUROLOGY | Facility: CLINIC | Age: 30
End: 2025-06-16
Payer: MEDICAID

## 2025-06-16 NOTE — TELEPHONE ENCOUNTER
CALLED PT TO LET HIM KNOW THAT I NEEDED TO RESCHEDULE HIS APPOINTMENT ON 6/26/2025 WAS ABLE TO RESCHEDULE TO 6/19/2025 AT 10:00 PT VOICED UNDERSTANDING

## 2025-06-27 ENCOUNTER — TELEMEDICINE (OUTPATIENT)
Dept: FAMILY MEDICINE CLINIC | Facility: CLINIC | Age: 30
End: 2025-06-27
Payer: MEDICAID

## 2025-06-27 ENCOUNTER — TELEPHONE (OUTPATIENT)
Dept: FAMILY MEDICINE CLINIC | Facility: CLINIC | Age: 30
End: 2025-06-27
Payer: MEDICAID

## 2025-06-27 DIAGNOSIS — R76.11 POSITIVE SKIN TEST FOR TUBERCULOSIS: Primary | ICD-10-CM

## 2025-06-27 PROCEDURE — 99213 OFFICE O/P EST LOW 20 MIN: CPT | Performed by: NURSE PRACTITIONER

## 2025-06-27 PROCEDURE — 1160F RVW MEDS BY RX/DR IN RCRD: CPT | Performed by: NURSE PRACTITIONER

## 2025-06-27 PROCEDURE — 1125F AMNT PAIN NOTED PAIN PRSNT: CPT | Performed by: NURSE PRACTITIONER

## 2025-06-27 PROCEDURE — 1159F MED LIST DOCD IN RCRD: CPT | Performed by: NURSE PRACTITIONER

## 2025-06-27 NOTE — TELEPHONE ENCOUNTER
I spoke with Stephania from health department on 6/26.  She reports patient has seen at [facility] and had positive TB skin test and now needs a chest xray. She is going to have patient contact us.      Patient contacted us today.  He is a new patient at [facility] treatment center and had a positive TB skin test.  He is contacting our office to schedule a chest xray. Patient has been scheduled for video visit this afternoon with Mary Ann.     I contacted treatment center and they are requesting patient signed release to send records.     Patient advised to wait in car until notified to enter building for chest xray, he will also need to wear a mask.  He voiced understanding.

## 2025-06-27 NOTE — PROGRESS NOTES
"Chief Complaint  Abnormal Lab    Subjective        Boo Blackburn presents via telehealth video visit with [facility]  History of Present Illness  Patient location: [place], [place]   Provider location: [place], [place]    Here for need for cxr  [facility] positive tb skin test this week  Tb test about 1 month ago which he reports was normal  Health dept notified  Pt denies in current exposure he knows of  He was recommended to get a cxr    Denies any s/s tb      Objective   Vital Signs:  There were no vitals taken for this visit.  Estimated body mass index is 24.11 kg/m² as calculated from the following:    Height as of 6/3/25: 177.8 cm (70\").    Weight as of 6/3/25: 76.2 kg (168 lb).    BMI is within normal parameters. No other follow-up for BMI required.      Physical Exam   No vital signs or bmi obtained for this encounter.      Physical exam-  General appearance- Alert, appears comfortable. Dressed appropriately. No distress.   HEENT- external examination of eyes, ears and nose all normal. Head is atraumatic. Hearing normal.   Neck is symmetric, trachea midline.   Normal respiratory effort.   Digits and nails appear healthy. No clubbing, rashes or discolorations.   Normal range of motion of all extremities.  Mood appropriate. Communicates easily. Normal judgement and insight. Oriented to time, place and person. Memory appears intact.     Result Review :                Assessment and Plan   Diagnoses and all orders for this visit:    1. Positive skin test for tuberculosis (Primary)  -     XR Chest 2 View      Will drop by office for cxr  Continue follow up with health dept recommendations         Follow Up   Return if symptoms worsen or fail to improve.  Patient was given instructions and counseling regarding his condition or for health maintenance advice. Please see specific information pulled into the AVS if appropriate.       You have chosen to receive care through a telehealth " visit.  The patient has signed the video visit consent form.  The visit included audio and video interaction. No technical issues occurred during this visit.

## 2025-07-07 ENCOUNTER — TELEPHONE (OUTPATIENT)
Dept: CARDIAC SURGERY | Facility: CLINIC | Age: 30
End: 2025-07-07
Payer: MEDICAID

## 2025-07-07 NOTE — TELEPHONE ENCOUNTER
Received a Prior Authorization request from [email or web address] for Atomoxetine HCL 40 MG Capsules. Reviewed pt's chart. He is not a [facility] patient and this medication is listed nowhere in his current or prior medications in this chart. This document was scanned into his media for reference.    I have called [facility] at [phone] and informed them of this. They will try to find the error and apologized for the mix up. Documented this as I am in his chart.

## 2025-07-31 DIAGNOSIS — F32.89 OTHER DEPRESSION: ICD-10-CM

## 2025-08-04 RX ORDER — BUPROPION HYDROCHLORIDE 300 MG/1
300 TABLET ORAL DAILY
Qty: 10 TABLET | Refills: 0 | Status: SHIPPED | OUTPATIENT
Start: 2025-08-04